# Patient Record
Sex: FEMALE | Race: WHITE | Employment: FULL TIME | URBAN - NONMETROPOLITAN AREA
[De-identification: names, ages, dates, MRNs, and addresses within clinical notes are randomized per-mention and may not be internally consistent; named-entity substitution may affect disease eponyms.]

---

## 2020-07-22 ENCOUNTER — TELEPHONE (OUTPATIENT)
Dept: FAMILY MEDICINE CLINIC | Age: 20
End: 2020-07-22

## 2020-07-22 NOTE — TELEPHONE ENCOUNTER
Pt has been diagnosed with strep and tonsilitis by [facility]. This is the second time she has had strep. She was tested for covid and returned negative 7/16. [facility] recommended that she follow up with PCP. Her last PCP was Dr. Luster Galeazzi. Please call with recommendations/appts.

## 2020-07-29 ENCOUNTER — OFFICE VISIT (OUTPATIENT)
Dept: PRIMARY CARE CLINIC | Age: 20
End: 2020-07-29
Payer: COMMERCIAL

## 2020-07-29 VITALS
RESPIRATION RATE: 18 BRPM | BODY MASS INDEX: 32.76 KG/M2 | HEIGHT: 62 IN | DIASTOLIC BLOOD PRESSURE: 82 MMHG | OXYGEN SATURATION: 96 % | TEMPERATURE: 97.8 F | WEIGHT: 178 LBS | HEART RATE: 81 BPM | SYSTOLIC BLOOD PRESSURE: 128 MMHG

## 2020-07-29 PROCEDURE — 99213 OFFICE O/P EST LOW 20 MIN: CPT | Performed by: FAMILY MEDICINE

## 2020-07-29 RX ORDER — PREDNISONE 10 MG/1
TABLET ORAL
COMMUNITY
Start: 2020-07-25 | End: 2020-11-03

## 2020-07-29 ASSESSMENT — PATIENT HEALTH QUESTIONNAIRE - PHQ9
SUM OF ALL RESPONSES TO PHQ QUESTIONS 1-9: 0
SUM OF ALL RESPONSES TO PHQ QUESTIONS 1-9: 0
SUM OF ALL RESPONSES TO PHQ9 QUESTIONS 1 & 2: 0
1. LITTLE INTEREST OR PLEASURE IN DOING THINGS: 0
2. FEELING DOWN, DEPRESSED OR HOPELESS: 0

## 2020-07-29 ASSESSMENT — ENCOUNTER SYMPTOMS
WHEEZING: 0
NAUSEA: 0
SHORTNESS OF BREATH: 0
VOMITING: 0

## 2020-07-29 NOTE — PROGRESS NOTES
[address] [facility] presents to the office today for   Chief Complaint   Patient presents with   [facility] follow up      Daya Watkins is here for ER follow up  Sore throat and drainage started on 7/19  Went to ER due to level of swelling in tonsils - felt like they were touching  No fever  Feeling a little bit better  Morning still with very sore throat  No contact sports or exercise  Still w/fatigue  She feels able to go back to work at the [place] [address] of [employer]   Constitutional: Negative for chills and fever. Respiratory: Negative for shortness of breath and wheezing. Cardiovascular: Negative for chest pain and palpitations. Gastrointestinal: Negative for nausea and vomiting. Genitourinary: Negative for dysuria, hematuria and urgency. Skin: Negative for rash. Neurological: Negative for dizziness and light-headedness. /82   Pulse 81   Temp 97.8 °F (36.6 °C) (Temporal)   Resp 18   Ht 5' 2\" (1.575 m)   Wt 178 lb (80.7 kg)   SpO2 96%   BMI 32.56 kg/m²   Physical Exam  Constitutional:       Appearance: Normal appearance. HENT:      Head: Normocephalic and atraumatic. Nose: Nose normal.      Mouth/Throat:      Mouth: Mucous membranes are moist.      Pharynx: Pharyngeal swelling, oropharyngeal exudate and posterior oropharyngeal erythema present. Eyes:      Extraocular Movements: Extraocular movements intact. Conjunctiva/sclera: Conjunctivae normal.   Cardiovascular:      Rate and Rhythm: Normal rate. Heart sounds: Normal heart sounds. Pulmonary:      Effort: Pulmonary effort is normal.      Breath sounds: Normal breath sounds. Skin:     General: Skin is warm. Neurological:      Mental Status: She is alert and oriented to person, place, and time.    Psychiatric:         Mood and Affect: Mood normal.         Behavior: Behavior normal.            Current Outpatient Medications:     predniSONE (DELTASONE) 10 MG tablet, TK 4 TS DAILY X 3 DAYS TK 3 TS DAILY X 3 DAYS TK 2 TS DAILY X 3 DAYS TK 1 T DAILY X 3 DAYS, Disp: , Rfl:      Past Medical History:   Diagnosis Date    Depression        Doc Shejasbirr was seen today for establish care.     Diagnoses and all orders for this visit:    Gammaherpesviral mononucleosis without complication       Improving  Ok to return to work as able  No contact sports or activity for 6 weeks  Finish prednisone  Tylenol prn aches and pains  Anticipatory guidance    Mor Christensen MD

## 2020-09-01 ENCOUNTER — HOSPITAL ENCOUNTER (OUTPATIENT)
Age: 20
Discharge: HOME | End: 2020-09-03
Payer: COMMERCIAL

## 2020-09-01 PROCEDURE — 87491 CHLMYD TRACH DNA AMP PROBE: CPT

## 2020-09-01 PROCEDURE — 87070 CULTURE OTHR SPECIMN AEROBIC: CPT

## 2020-09-01 PROCEDURE — 87210 SMEAR WET MOUNT SALINE/INK: CPT

## 2020-09-01 PROCEDURE — 87591 N.GONORRHOEAE DNA AMP PROB: CPT

## 2020-09-04 LAB
C TRACH DNA GENITAL QL NAA+PROBE: NEGATIVE
N. GONORRHOEAE DNA: NEGATIVE
SOURCE: NORMAL

## 2020-09-05 LAB — GENITAL CULTURE, ROUTINE: NORMAL

## 2020-09-07 LAB — CULTURE, TRICHOMONAS: NORMAL

## 2020-11-03 ENCOUNTER — TELEPHONE (OUTPATIENT)
Dept: FAMILY MEDICINE CLINIC | Age: 20
End: 2020-11-03

## 2020-11-03 ENCOUNTER — OFFICE VISIT (OUTPATIENT)
Dept: FAMILY MEDICINE CLINIC | Age: 20
End: 2020-11-03
Payer: COMMERCIAL

## 2020-11-03 VITALS
WEIGHT: 189 LBS | OXYGEN SATURATION: 98 % | BODY MASS INDEX: 34.57 KG/M2 | SYSTOLIC BLOOD PRESSURE: 116 MMHG | DIASTOLIC BLOOD PRESSURE: 76 MMHG | HEART RATE: 94 BPM | TEMPERATURE: 97.1 F

## 2020-11-03 PROCEDURE — 99213 OFFICE O/P EST LOW 20 MIN: CPT | Performed by: FAMILY MEDICINE

## 2020-11-03 RX ORDER — SULFAMETHOXAZOLE AND TRIMETHOPRIM 800; 160 MG/1; MG/1
1 TABLET ORAL 2 TIMES DAILY
Qty: 20 TABLET | Refills: 0 | Status: SHIPPED
Start: 2020-11-03 | End: 2020-12-16

## 2020-11-03 ASSESSMENT — ENCOUNTER SYMPTOMS
EYES NEGATIVE: 1
SINUS PRESSURE: 0
SINUS PAIN: 0
RHINORRHEA: 0
FACIAL SWELLING: 1
SORE THROAT: 0
RESPIRATORY NEGATIVE: 1

## 2020-11-03 NOTE — TELEPHONE ENCOUNTER
Left message for pt to call back    Dr Kevan Pascal would like for pt to be scheduled for appt on Monday 11/9/2020 for a follow up from her express care visit.

## 2020-11-03 NOTE — PROGRESS NOTES
11/3/20  Sarah Taveras : 2000 Sex: female  Age: 21 y.o. Chief Complaint   Patient presents with   Katrinka Frankel     left neck       Patient is a 80-year-old white female with with a chief complaint of a swollen node at the angle of the jaw on the left side of her neck submandibularly. Is been present for about a week she does have a past medical history of mono several months prior. She has had no fevers chills sore throat cough no nasal discharge no areas of cellulitis on the neck or above. Review of Systems   Constitutional: Negative. HENT: Positive for facial swelling. Negative for postnasal drip, rhinorrhea, sinus pressure, sinus pain, sneezing and sore throat. Eyes: Negative. Respiratory: Negative. Cardiovascular: Negative. Skin: Negative. No current outpatient medications on file. Allergies   Allergen Reactions    Amoxicillin        Past Medical History:   Diagnosis Date    Depression     Mononucleosis      No past surgical history on file. Family History   Problem Relation Age of Onset    Diabetes type 2  Maternal Grandfather      Social History     Tobacco Use    Smoking status: Never Smoker    Smokeless tobacco: Never Used   Substance Use Topics    Alcohol use: No    Drug use: No        Vitals:    20 0919   BP: 116/76   Pulse: 94   Temp: 97.1 °F (36.2 °C)   SpO2: 98%   Weight: 189 lb (85.7 kg)       Physical Exam  Vitals signs reviewed. Constitutional:       Appearance: Normal appearance. HENT:      Head: Normocephalic and atraumatic. Right Ear: Tympanic membrane, ear canal and external ear normal. There is no impacted cerumen. Left Ear: Tympanic membrane, ear canal and external ear normal. There is no impacted cerumen. Nose: Nose normal.      Mouth/Throat:      Mouth: Mucous membranes are moist.      Pharynx: Oropharynx is clear. No oropharyngeal exudate or posterior oropharyngeal erythema.    Cardiovascular:      Rate and Rhythm: Normal rate and regular rhythm. Heart sounds: No murmur. Pulmonary:      Effort: Pulmonary effort is normal.      Breath sounds: Normal breath sounds. Abdominal:      General: There is no distension. Palpations: There is no mass. Tenderness: There is no abdominal tenderness. There is no right CVA tenderness, guarding or rebound. Hernia: No hernia is present. Lymphadenopathy:      Cervical: Cervical adenopathy present. Skin:     General: Skin is warm and dry. Findings: Lesion present. Neurological:      Mental Status: She is alert. Assessment and Plan:  There are no diagnoses linked to this encounter. No orders of the defined types were placed in this encounter. Patient advised to follow up with PCP as needed. Seen By:  Gian Kumar, DO  No other adenopathy is palpable I did examine Stensen's duct it is not inflamed there is tenderness somewhat over the parotid into the angle of the jaw and is very jaw and its very tender to palpation. Going to treat her with Bactrim and she is allergic to penicillin and reexamine her in 5 days and to follow with her PCP.

## 2020-11-09 ENCOUNTER — OFFICE VISIT (OUTPATIENT)
Dept: FAMILY MEDICINE CLINIC | Age: 20
End: 2020-11-09
Payer: COMMERCIAL

## 2020-11-09 VITALS
HEART RATE: 90 BPM | OXYGEN SATURATION: 99 % | BODY MASS INDEX: 34.71 KG/M2 | WEIGHT: 189.8 LBS | SYSTOLIC BLOOD PRESSURE: 116 MMHG | DIASTOLIC BLOOD PRESSURE: 78 MMHG | TEMPERATURE: 97.7 F

## 2020-11-09 PROCEDURE — 99213 OFFICE O/P EST LOW 20 MIN: CPT | Performed by: FAMILY MEDICINE

## 2020-11-12 ENCOUNTER — TELEPHONE (OUTPATIENT)
Dept: FAMILY MEDICINE CLINIC | Age: 20
End: 2020-11-12

## 2020-11-12 NOTE — TELEPHONE ENCOUNTER
Called and left a message for the office notifying of urgent referral.  I asked for a return call with an appt.   Faxed referral/imaging/progress note   F# [phone]  P# [phone]

## 2020-11-12 NOTE — TELEPHONE ENCOUNTER
Can someone please call Dr. Eloisa Galeana and see how soon they could see patient? She has left neck mass that needs biopsied?   thanks

## 2020-12-16 ENCOUNTER — OFFICE VISIT (OUTPATIENT)
Dept: PRIMARY CARE CLINIC | Age: 20
End: 2020-12-16
Payer: COMMERCIAL

## 2020-12-16 VITALS
SYSTOLIC BLOOD PRESSURE: 110 MMHG | HEART RATE: 97 BPM | WEIGHT: 194 LBS | TEMPERATURE: 97.2 F | DIASTOLIC BLOOD PRESSURE: 74 MMHG | BODY MASS INDEX: 35.48 KG/M2 | OXYGEN SATURATION: 98 %

## 2020-12-16 PROCEDURE — 99214 OFFICE O/P EST MOD 30 MIN: CPT | Performed by: FAMILY MEDICINE

## 2020-12-16 NOTE — PROGRESS NOTES
[address] presents to the office today for   Chief Complaint   Patient presents with    Other     discuss ct scan of neck ordered by Dr. Evelio Del Toro     CT scan of neck showing possible lymphoma  Dr. Evelio Del Toro appt Friday  Feeling fatigue  Neck feels about the same  No fever or night sweats    Review of Systems     /74   Pulse 97   Temp 97.2 °F (36.2 °C) (Temporal)   Wt 194 lb (88 kg)   SpO2 98%   BMI 35.48 kg/m²   Physical Exam  Constitutional:       Appearance: Normal appearance. HENT:      Head: Normocephalic and atraumatic. Comments: lymphadenopathy     Mouth/Throat:      Pharynx: No posterior oropharyngeal erythema. Eyes:      Extraocular Movements: Extraocular movements intact. Conjunctiva/sclera: Conjunctivae normal.   Cardiovascular:      Rate and Rhythm: Normal rate. Pulmonary:      Effort: Pulmonary effort is normal.   Skin:     General: Skin is warm. Neurological:      Mental Status: She is alert and oriented to person, place, and time. Psychiatric:         Mood and Affect: Mood normal.         Behavior: Behavior normal.          No current outpatient medications on file. Past Medical History:   Diagnosis Date    Depression     Mononucleosis        Nanci Asp was seen today for other. Diagnoses and all orders for this visit:    Neck mass  -     Amb External Referral To Hematology Oncology  -     CBC Auto Differential; Future  -     C-REACTIVE PROTEIN; Future  -     SEDIMENTATION RATE; Future    Lymphoma of lymph nodes of neck, unspecified lymphoma type (HCC)  -     CBC Auto Differential; Future  -     C-REACTIVE PROTEIN; Future  -     SEDIMENTATION RATE;  Future       Referral to oncology  See ENT   Call me with any questions or concerns  Labs pending  I can write work excuse if symptoms preclude working  Pt denies need right now    Rosalie Espinoza MD

## 2020-12-18 ENCOUNTER — TELEPHONE (OUTPATIENT)
Dept: PRIMARY CARE CLINIC | Age: 20
End: 2020-12-18

## 2020-12-18 NOTE — TELEPHONE ENCOUNTER
Pt saw ent this am and would like to discuss what he said with you.   Please call her after 4:00.  [phone]

## 2020-12-23 ENCOUNTER — TELEPHONE (OUTPATIENT)
Dept: PRIMARY CARE CLINIC | Age: 20
End: 2020-12-23

## 2020-12-23 DIAGNOSIS — R22.1 NECK MASS: Primary | ICD-10-CM

## 2020-12-23 DIAGNOSIS — C85.91 LYMPHOMA OF LYMPH NODES OF NECK, UNSPECIFIED LYMPHOMA TYPE (HCC): ICD-10-CM

## 2020-12-23 NOTE — TELEPHONE ENCOUNTER
Patient reports: Dr. Cuauhtemoc Benites not in network, she could see a Dr Mery Lee or Dr. Abraham Ellison. Who would you suggest she see?  Dr. Maryann Steele

## 2020-12-28 ENCOUNTER — TELEPHONE (OUTPATIENT)
Dept: PRIMARY CARE CLINIC | Age: 20
End: 2020-12-28

## 2020-12-28 NOTE — TELEPHONE ENCOUNTER
Pt states that she had her biopsy today on her neck lymphnodes and states she would rather do the follow up with  rather than Dr. Renae Palomo . Is this okay?

## 2020-12-29 NOTE — TELEPHONE ENCOUNTER
S/p surgery   Per primary team       Yes but I do not have pathology from their office yet, can you call and see if it is back and if they can fax? Also, did she get oncology appt?

## 2020-12-29 NOTE — TELEPHONE ENCOUNTER
Spoke to [facility] office, gladis have pathology report for a few day, then they will fax it to us.

## 2021-01-04 NOTE — TELEPHONE ENCOUNTER
[facility] office called back, stated the pathology report is still not back, as soon as they get it, they will fax it to us.

## 2021-01-06 ENCOUNTER — TELEPHONE (OUTPATIENT)
Dept: PRIMARY CARE CLINIC | Age: 21
End: 2021-01-06

## 2021-01-06 DIAGNOSIS — R22.1 NECK MASS: Primary | ICD-10-CM

## 2021-01-06 DIAGNOSIS — C85.91 LYMPHOMA OF LYMPH NODES OF NECK, UNSPECIFIED LYMPHOMA TYPE (HCC): ICD-10-CM

## 2021-01-06 NOTE — TELEPHONE ENCOUNTER
Pt checked her insurance for an oncologist that is in network and she found Dr. Marlon Del Rio ,could Dr. Carlotta Ayala referr her?

## 2021-01-11 NOTE — TELEPHONE ENCOUNTER
Can you tell her they sent it out to [place] for further evaluation, likely to have a more specialized pathologist review it?

## 2021-01-13 ENCOUNTER — TELEPHONE (OUTPATIENT)
Dept: PRIMARY CARE CLINIC | Age: 21
End: 2021-01-13

## 2021-01-13 NOTE — TELEPHONE ENCOUNTER
Call pt  Looks like she was called with pathology results which were negative  See if she can come see me next week to discuss next steps

## 2021-01-20 ENCOUNTER — OFFICE VISIT (OUTPATIENT)
Dept: PRIMARY CARE CLINIC | Age: 21
End: 2021-01-20
Payer: COMMERCIAL

## 2021-01-20 VITALS
TEMPERATURE: 98.1 F | BODY MASS INDEX: 34.04 KG/M2 | OXYGEN SATURATION: 97 % | DIASTOLIC BLOOD PRESSURE: 80 MMHG | SYSTOLIC BLOOD PRESSURE: 110 MMHG | HEART RATE: 101 BPM | HEIGHT: 62 IN | RESPIRATION RATE: 18 BRPM | WEIGHT: 185 LBS

## 2021-01-20 DIAGNOSIS — R22.1 NECK MASS: ICD-10-CM

## 2021-01-20 DIAGNOSIS — J98.59 MEDIASTINAL MASS: Primary | ICD-10-CM

## 2021-01-20 PROCEDURE — 99213 OFFICE O/P EST LOW 20 MIN: CPT | Performed by: FAMILY MEDICINE

## 2021-01-20 ASSESSMENT — PATIENT HEALTH QUESTIONNAIRE - PHQ9
2. FEELING DOWN, DEPRESSED OR HOPELESS: 0
SUM OF ALL RESPONSES TO PHQ QUESTIONS 1-9: 0

## 2021-01-20 NOTE — PROGRESS NOTES
[address] presents to the office today for   Chief Complaint   Patient presents with    Follow-up     To discuss biopsy results. Biopsy neg  Feeling well  No fevers or sweats  Mass in neck has resolved to palpation by patient    Review of Systems     /80   Pulse 101   Temp 98.1 °F (36.7 °C)   Resp 18   Ht 5' 2\" (1.575 m)   Wt 185 lb (83.9 kg)   SpO2 97%   BMI 33.84 kg/m²   Physical Exam  Constitutional:       Appearance: Normal appearance. HENT:      Head: Normocephalic and atraumatic. Eyes:      Extraocular Movements: Extraocular movements intact. Conjunctiva/sclera: Conjunctivae normal.   Cardiovascular:      Rate and Rhythm: Normal rate. Heart sounds: Normal heart sounds. Pulmonary:      Effort: Pulmonary effort is normal.      Breath sounds: Normal breath sounds. Skin:     General: Skin is warm. Neurological:      Mental Status: She is alert and oriented to person, place, and time. Psychiatric:         Mood and Affect: Mood normal.         Behavior: Behavior normal.          No current outpatient medications on file. Past Medical History:   Diagnosis Date    Depression     Mononucleosis        Kin Charley was seen today for follow-up. Diagnoses and all orders for this visit:    Mediastinal mass  -     CT SOFT TISSUE NECK WO CONTRAST; Future    Neck mass  -     CT SOFT TISSUE NECK WO CONTRAST;  Future       I want to check CT again for mediastinal mass  If improving or resolved will defer further work up    Jim Nowak MD

## 2021-04-08 ENCOUNTER — OFFICE VISIT (OUTPATIENT)
Dept: FAMILY MEDICINE CLINIC | Age: 21
End: 2021-04-08
Payer: COMMERCIAL

## 2021-04-08 VITALS
TEMPERATURE: 99 F | BODY MASS INDEX: 36.44 KG/M2 | RESPIRATION RATE: 18 BRPM | OXYGEN SATURATION: 97 % | HEART RATE: 97 BPM | HEIGHT: 62 IN | WEIGHT: 198 LBS | DIASTOLIC BLOOD PRESSURE: 72 MMHG | SYSTOLIC BLOOD PRESSURE: 128 MMHG

## 2021-04-08 DIAGNOSIS — R05.9 COUGH: Primary | ICD-10-CM

## 2021-04-08 LAB
Lab: NORMAL
PERFORMING INSTRUMENT: NORMAL
QC PASS/FAIL: NORMAL
SARS-COV-2, POC: NORMAL

## 2021-04-08 PROCEDURE — 87426 SARSCOV CORONAVIRUS AG IA: CPT | Performed by: PHYSICIAN ASSISTANT

## 2021-04-08 PROCEDURE — 99213 OFFICE O/P EST LOW 20 MIN: CPT | Performed by: PHYSICIAN ASSISTANT

## 2021-04-08 NOTE — PROGRESS NOTES
2021   [address] [place] [id number]     Mirtha Guerrier  : 2000 Age: 21 y.o. Sex: female       Subjective:  Chief Complaint   Patient presents with    Cough       HPI:  The patient states that they have had a cough, runny nose and nasal congestion for the last 2-3 days. Cough is productive of sputum. The patient denies sore throat without pain with swallowing/difficulty swallowing. Denies fever or chills. Denies loss of taste or smell. Denies myalgias fatigue or headache. Patient denies CP or dyspnea. No vomiting or diarrhea. The patient presents for evaluation. ROS:  Positive and pertinent negatives as per HPI. All other systems are reviewed and negative. No current outpatient medications on file. Allergies   Allergen Reactions    Amoxicillin         Objective:  Vitals:    21 1200   BP: 128/72   Pulse: 97   Resp: 18   Temp: 99 °F (37.2 °C)   TempSrc: Temporal   SpO2: 97%   Weight: 198 lb (89.8 kg)   Height: 5' 2\" (1.575 m)      Exam:  Const: Appears healthy and well developed. No signs of acute distress present. Vitals reviewed per triage. Head/Face: Normocephalic, atraumatic. Facies is symmetric. Eyes: PERRL. ENMT: Tympanic membranes are pearly gray with good light reflex bilaterally. Nares with clear rhinorrhea. Buccal mucosa is moist. No erythema in the posterior pharynx without edema of oropharynx or petechiae of palate. Neck: Supple and symmetric. Palpation reveals no adenopathy. No meningeal signs. Trachea midline. Resp: Lungs are clear to auscultation bilaterally without wheezes, rhonchi, or crackles. Chest expansion was symmetrical without accessory muscle use noted. CV: S1 is normal. S2 is normal.  Musculo: Patient moves extremities without pain or limitation. Pulses are equal bilaterally. Skin: Skin is warm and dry. Neuro: Alert and oriented x3. Speech is articulate and fluent.

## 2021-04-12 ENCOUNTER — OFFICE VISIT (OUTPATIENT)
Dept: FAMILY MEDICINE CLINIC | Age: 21
End: 2021-04-12
Payer: COMMERCIAL

## 2021-04-12 VITALS
WEIGHT: 198 LBS | HEIGHT: 62 IN | TEMPERATURE: 100 F | DIASTOLIC BLOOD PRESSURE: 80 MMHG | RESPIRATION RATE: 20 BRPM | SYSTOLIC BLOOD PRESSURE: 130 MMHG | HEART RATE: 106 BPM | OXYGEN SATURATION: 97 % | BODY MASS INDEX: 36.44 KG/M2

## 2021-04-12 DIAGNOSIS — J20.9 ACUTE BRONCHITIS, UNSPECIFIED ORGANISM: Primary | ICD-10-CM

## 2021-04-12 PROCEDURE — 99213 OFFICE O/P EST LOW 20 MIN: CPT | Performed by: PHYSICIAN ASSISTANT

## 2021-04-12 RX ORDER — AZITHROMYCIN 250 MG/1
250 TABLET, FILM COATED ORAL SEE ADMIN INSTRUCTIONS
Qty: 6 TABLET | Refills: 0 | Status: SHIPPED | OUTPATIENT
Start: 2021-04-12 | End: 2021-04-17

## 2021-04-12 RX ORDER — BENZONATATE 200 MG/1
200 CAPSULE ORAL 3 TIMES DAILY PRN
Qty: 21 CAPSULE | Refills: 0 | Status: SHIPPED | OUTPATIENT
Start: 2021-04-12 | End: 2021-04-19

## 2021-04-12 NOTE — PROGRESS NOTES
2021   [address] [place] [phone]     Sarah Coles  : 2000 Age: 21 y.o. Sex: female       Subjective:  Chief Complaint   Patient presents with    Cough       HPI:  The patient states that they have had a cough, runny nose and nasal congestion for the last 7-8 days. Patient was seen last week was negative for Covid. Cough is productive of sputum. The patient denies sore throat without pain with swallowing/difficulty swallowing. Denies fever or chills. Patient states cough is not getting any better. Patient denies CP or dyspnea. No vomiting or diarrhea. The patient presents for evaluation. ROS:  Positive and pertinent negatives as per HPI. All other systems are reviewed and negative. Current Outpatient Medications:     azithromycin (ZITHROMAX) 250 MG tablet, Take 1 tablet by mouth See Admin Instructions for 5 days 500mg on day 1 followed by 250mg on days 2 - 5, Disp: 6 tablet, Rfl: 0    benzonatate (TESSALON) 200 MG capsule, Take 1 capsule by mouth 3 times daily as needed for Cough, Disp: 21 capsule, Rfl: 0   Allergies   Allergen Reactions    Amoxicillin         Objective:  Vitals:    21 1449   BP: 130/80   Pulse: 106   Resp: 20   Temp: 100 °F (37.8 °C)   TempSrc: Tympanic   SpO2: 97%   Weight: 198 lb (89.8 kg)   Height: 5' 2\" (1.575 m)      Exam:  Const: Appears healthy and well developed. No signs of acute distress present. Vitals reviewed per triage. Head/Face: Normocephalic, atraumatic. Facies is symmetric. Eyes: PERRL. ENMT: Tympanic membranes are pearly gray with good light reflex bilaterally. Nares with clear rhinorrhea. Buccal mucosa is moist. No erythema in the posterior pharynx without edema of oropharynx or petechiae of palate. Neck: Supple and symmetric. Palpation reveals no adenopathy. No meningeal signs. Trachea midline.   Resp: Lungs are clear to auscultation bilaterally without wheezes, rhonchi, or crackles. Chest expansion was symmetrical without accessory muscle use noted. CV: S1 is normal. S2 is normal.  Musculo: Patient moves extremities without pain or limitation. Pulses are equal bilaterally. Skin: Skin is warm and dry. Neuro: Alert and oriented x3. Speech is articulate and fluent. Psych: Mood and affect are appropriate to situation. Assessment and Plan:  Pauline Vaughn was seen today for cough. Diagnoses and all orders for this visit:    Acute bronchitis, unspecified organism  -     azithromycin (ZITHROMAX) 250 MG tablet; Take 1 tablet by mouth See Admin Instructions for 5 days 500mg on day 1 followed by 250mg on days 2 - 5    Other orders  -     benzonatate (TESSALON) 200 MG capsule; Take 1 capsule by mouth 3 times daily as needed for Cough         Return for Folow up with PCP in 7-10 days for re-evaluation. .      Discussed the use of probiotics to help prevent GI issues including C-diff colitis.       Seen By:    ANNEMARIE Gaffney

## 2021-07-23 ENCOUNTER — OFFICE VISIT (OUTPATIENT)
Dept: PRIMARY CARE CLINIC | Age: 21
End: 2021-07-23
Payer: COMMERCIAL

## 2021-07-23 VITALS
BODY MASS INDEX: 35.12 KG/M2 | WEIGHT: 192 LBS | OXYGEN SATURATION: 99 % | HEART RATE: 101 BPM | SYSTOLIC BLOOD PRESSURE: 110 MMHG | TEMPERATURE: 97.7 F | DIASTOLIC BLOOD PRESSURE: 60 MMHG

## 2021-07-23 DIAGNOSIS — R05.9 COUGH: Primary | ICD-10-CM

## 2021-07-23 DIAGNOSIS — R53.83 FATIGUE, UNSPECIFIED TYPE: ICD-10-CM

## 2021-07-23 DIAGNOSIS — R51.9 ACUTE NONINTRACTABLE HEADACHE, UNSPECIFIED HEADACHE TYPE: ICD-10-CM

## 2021-07-23 DIAGNOSIS — R52 BODY ACHES: ICD-10-CM

## 2021-07-23 DIAGNOSIS — R06.02 SHORTNESS OF BREATH: ICD-10-CM

## 2021-07-23 LAB
INFLUENZA A ANTIGEN, POC: NORMAL
INFLUENZA B ANTIGEN, POC: NORMAL
Lab: NORMAL
PERFORMING INSTRUMENT: NORMAL
QC PASS/FAIL: NORMAL
SARS-COV-2, POC: NORMAL

## 2021-07-23 PROCEDURE — 87804 INFLUENZA ASSAY W/OPTIC: CPT | Performed by: NURSE PRACTITIONER

## 2021-07-23 PROCEDURE — 99213 OFFICE O/P EST LOW 20 MIN: CPT | Performed by: NURSE PRACTITIONER

## 2021-07-23 PROCEDURE — 87426 SARSCOV CORONAVIRUS AG IA: CPT | Performed by: NURSE PRACTITIONER

## 2021-07-23 RX ORDER — DOXYCYCLINE HYCLATE 100 MG
100 TABLET ORAL 2 TIMES DAILY
Qty: 20 TABLET | Refills: 0 | Status: SHIPPED | OUTPATIENT
Start: 2021-07-23 | End: 2021-08-02

## 2021-07-23 NOTE — PROGRESS NOTES
Chief Complaint   Nausea (diarrhea, prod. cough, sob, ha, st at first, some body aches x 1 week )      History of Present Illness   Source of history provided by:  patient. Fausto Cárdenas is a 24 y.o. old female who presents to the walk in clinic with complaints of Generalized Body Aches, Headache, Pharyngitis, Nasal Congestion, Post nasal drip, productive (yellow mucus) Cough, Chest congestion, Shortness of breath, Nausea, Diarrhea  and Fatigue x 8 days. States symptoms have stayed the same since onset. Has been taking Acetaminophen OTC, without symptomatic relief. Denies any Fever, Vomiting, Chest Pain, LE Edema, Abdominal Pain or Rash. Denies any hx of COPD, emphysema or pneumonia. Reports asthma as a child. Had COVID-19 in [address]. ROS   Pertinent positives and negatives are stated within HPI, all other systems reviewed and are negative. Past Medical History:  has a past medical history of Depression and Mononucleosis. Past Surgical History:  has no past surgical history on file. Social History:  reports that she has never smoked. She has never used smokeless tobacco. She reports that she does not drink alcohol and does not use drugs. Family History: family history includes Diabetes type 2  in her maternal grandfather. Allergies: Amoxicillin    Physical Exam   Vital Signs:  /60   Pulse 101   Temp 97.7 °F (36.5 °C) (Temporal)   Wt 192 lb (87.1 kg)   SpO2 99%   BMI 35.12 kg/m²    Oxygen Saturation Interpretation: Normal.    Constitutional:  Alert, development consistent with age. NAD. Head:  NC/NT. Airway patent. Ears: TMs clear bilaterally. Canals without exudate or swelling bilaterally. Mouth: Posterior pharynx with mild erythema and clear postnasal drip. Mild tonsillar hypertrophy or exudate. Neck:  Normal ROM. Supple. No anterior cervical adenopathy noted. Lungs: CTAB without wheezes, rales, or rhonchi.    CV:  Regular rate and rhythm, normal heart sounds, without pathological murmurs, ectopy, gallops, or rubs. Skin:  Normal turgor. Warm, dry, without visible rash. Lymphatic: No lymphangitis or adenopathy noted. Neurological:  Oriented. Motor functions intact. Lab / Imaging Results   (All laboratory and radiology results have been personally reviewed by myself)  Labs:  Results for orders placed or performed in visit on 07/23/21   POCT COVID-19, Antigen   Result Value Ref Range    SARS-COV-2, POC Not-Detected Not Detected    Lot Number [id number]     QC Pass/Fail pass     Performing Instrument BD Veritor    POCT Influenza A/B Antigen (BD Veritor)   Result Value Ref Range    Inflenza A Ag neg     Influenza B Ag neg        Imaging: All Radiology results interpreted by Radiologist unless otherwise noted. No results found. Medical Decision Making   Pt non-toxic, in no apparent distress and stable at time of discharge. Assessment/Plan   Yaritza Coyle was seen today for nausea. Diagnoses and all orders for this visit:    Cough  -     POCT COVID-19, Antigen  -     POCT Influenza A/B Antigen (BD Veritor)  -     doxycycline hyclate (VIBRA-TABS) 100 MG tablet; Take 1 tablet by mouth 2 times daily for 10 days    Body aches    Acute nonintractable headache, unspecified headache type    Fatigue, unspecified type    Shortness of breath        Rapid COVID-19 swab and influenza A/B obtained and noted to be negative. Increase fluids and rest. Symptomatic relief discussed including Tylenol prn pain/fever. Schedule f/u with PCP in 7-10 days if symptoms persist. ED sooner if symptoms worsen or change. ED immediately with high or refractory fever, progressive SOB, dyspnea, CP, calf pain/swelling, shaking chills, vomiting, abdominal pain, lethargy, flank pain, or decreased urinary output. Pt verbalizes understanding and is in agreement with plan of care. All questions answered.     DONNIE Guaman NP    This visit was provided as a focused evaluation during the [place] -19 pandemic/national emergency. A comprehensive review of all previous patient history and testing was not conducted. Pertinent findings were elicited during the visit. *NOTE: This report was transcribed using voice recognition software. Every effort was made to ensure accuracy; however, inadvertent computerized transcription errors may be present.

## 2021-09-28 ENCOUNTER — OFFICE VISIT (OUTPATIENT)
Dept: PRIMARY CARE CLINIC | Age: 21
End: 2021-09-28
Payer: COMMERCIAL

## 2021-09-28 VITALS
WEIGHT: 190.6 LBS | TEMPERATURE: 97.7 F | HEART RATE: 120 BPM | BODY MASS INDEX: 34.86 KG/M2 | DIASTOLIC BLOOD PRESSURE: 70 MMHG | OXYGEN SATURATION: 97 % | SYSTOLIC BLOOD PRESSURE: 110 MMHG

## 2021-09-28 DIAGNOSIS — R09.81 NASAL CONGESTION: ICD-10-CM

## 2021-09-28 DIAGNOSIS — R50.9 FEVER, UNSPECIFIED FEVER CAUSE: Primary | ICD-10-CM

## 2021-09-28 DIAGNOSIS — R11.2 NAUSEA AND VOMITING, INTRACTABILITY OF VOMITING NOT SPECIFIED, UNSPECIFIED VOMITING TYPE: ICD-10-CM

## 2021-09-28 DIAGNOSIS — J02.9 ACUTE PHARYNGITIS, UNSPECIFIED ETIOLOGY: ICD-10-CM

## 2021-09-28 LAB
Lab: NORMAL
PERFORMING INSTRUMENT: NORMAL
QC PASS/FAIL: NORMAL
SARS-COV-2, POC: NORMAL

## 2021-09-28 PROCEDURE — 87426 SARSCOV CORONAVIRUS AG IA: CPT | Performed by: NURSE PRACTITIONER

## 2021-09-28 PROCEDURE — 99213 OFFICE O/P EST LOW 20 MIN: CPT | Performed by: NURSE PRACTITIONER

## 2021-09-28 RX ORDER — ONDANSETRON 4 MG/1
4 TABLET, ORALLY DISINTEGRATING ORAL 3 TIMES DAILY PRN
Qty: 21 TABLET | Refills: 0 | Status: SHIPPED
Start: 2021-09-28 | End: 2021-12-02

## 2021-09-28 RX ORDER — CEFDINIR 300 MG/1
300 CAPSULE ORAL 2 TIMES DAILY
Qty: 20 CAPSULE | Refills: 0 | Status: SHIPPED | OUTPATIENT
Start: 2021-09-28 | End: 2021-10-08

## 2021-09-28 RX ORDER — FLUTICASONE PROPIONATE 50 MCG
2 SPRAY, SUSPENSION (ML) NASAL DAILY
Qty: 16 G | Refills: 0 | Status: SHIPPED
Start: 2021-09-28 | End: 2021-12-02

## 2021-09-28 NOTE — PROGRESS NOTES
Chief Complaint   Pharyngitis (fever- up to 101, nausea vomiting, sinsus drainage down throat, dizzy x 1 day )      History of Present Illness   Source of history provided by:  patient. Chaz James is a 24 y.o. old female who presents to the walk in clinic with complaints of Fever, Chills, Generalized Body Aches, Headache, Pharyngitis, Post nasal drip, Nausea, Vomiting, Fatigue and Dizziness x 1 days. States symptoms have stayed the same since onset. Has been taking a Cold and Flu medications, without symptomatic relief. Denies any Cough, Shortness of breath, Chest Pain, LE Edema, Abdominal Pain or Rash. Denies any hx of asthma, COPD, emphysema or pneumonia. ROS   Pertinent positives and negatives are stated within HPI, all other systems reviewed and are negative. Past Medical History:  has a past medical history of Depression and Mononucleosis. Past Surgical History:  has no past surgical history on file. Social History:  reports that she has never smoked. She has never used smokeless tobacco. She reports that she does not drink alcohol and does not use drugs. Family History: family history includes Diabetes type 2  in her maternal grandfather. Allergies: Amoxicillin    Physical Exam   Vital Signs:  /70   Pulse 120   Temp 97.7 °F (36.5 °C) (Temporal)   Wt 190 lb 9.6 oz (86.5 kg)   SpO2 97%   BMI 34.86 kg/m²    Oxygen Saturation Interpretation: Normal.    Constitutional:  Alert, development consistent with age. NAD. Head:  NC/NT. Airway patent. Ears: TMs clear bilaterally. Canals without exudate or swelling bilaterally. Mouth: Posterior pharynx with moderate erythema and clear postnasal drip. Slight tonsillar hypertrophy or exudate. Neck:  Normal ROM. Supple. No anterior cervical adenopathy noted. Lungs: CTAB without wheezes, rales, or rhonchi. CV:  Regular rate and rhythm, normal heart sounds, without pathological murmurs, ectopy, gallops, or rubs. Skin:  Normal turgor. Warm, dry, without visible rash. Lymphatic: No lymphangitis or adenopathy noted. Neurological:  Oriented. Motor functions intact. Lab / Imaging Results   (All laboratory and radiology results have been personally reviewed by myself)  Labs:  Results for orders placed or performed in visit on 09/28/21   POCT COVID-19, Antigen   Result Value Ref Range    SARS-COV-2, POC Not-Detected Not Detected    Lot Number [id number]     QC Pass/Fail pass     Performing Instrument BD Veritor        Imaging: All Radiology results interpreted by Radiologist unless otherwise noted. No results found. Medical Decision Making   Pt non-toxic, in no apparent distress and stable at time of discharge. Assessment/Plan   Lainey Ruffin was seen today for pharyngitis. Diagnoses and all orders for this visit:    Fever, unspecified fever cause  -     POCT COVID-19, Antigen  -     COVID-19 Ambulatory; Future    Nausea and vomiting, intractability of vomiting not specified, unspecified vomiting type  -     POCT COVID-19, Antigen  -     COVID-19 Ambulatory; Future  -     ondansetron (ZOFRAN-ODT) 4 MG disintegrating tablet; Take 1 tablet by mouth 3 times daily as needed for Nausea or Vomiting    Nasal congestion  -     fluticasone (FLONASE) 50 MCG/ACT nasal spray; 2 sprays by Each Nostril route daily    Acute pharyngitis, unspecified etiology  -     cefdinir (OMNICEF) 300 MG capsule; Take 1 capsule by mouth 2 times daily for 10 days        Rapid COVID-19 swab noted to be negative. Send out COVID-19 swab obtained and pending, will call with results once available. Advised self-quarantine at home in the interim. Work excuse provided to patient today. Increase fluids and rest. Symptomatic relief discussed including Tylenol prn pain/fever. Schedule f/u with PCP in 7-10 days if symptoms persist. ED sooner if symptoms worsen or change.  ED immediately with high or refractory fever, progressive SOB, dyspnea, CP, calf pain/swelling, shaking chills, vomiting, abdominal pain, lethargy, flank pain, or decreased urinary output. Pt verbalizes understanding and is in agreement with plan of care. All questions answered. DONNIE Allen NP    This visit was provided as a focused evaluation during the [place] -19 pandemic/national emergency. A comprehensive review of all previous patient history and testing was not conducted. Pertinent findings were elicited during the visit. *NOTE: This report was transcribed using voice recognition software. Every effort was made to ensure accuracy; however, inadvertent computerized transcription errors may be present.

## 2021-09-28 NOTE — LETTER
[address] [facility] [address]  Phone: [phone]  Fax: [phone]    DONNIE Flood NP        September 28, 2021     Patient: Sheba Loera   YOB: 2000   Date of Visit: 9/28/2021       To Whom It May Concern: It is my medical opinion that Ty Rizzo should remain out of work until she receives a negative send out COVID-19 swab or, 10 days after the onset of her symptoms. If you have any questions or concerns, please don't hesitate to call.     Sincerely,        DONNIE Flood NP

## 2021-10-05 ENCOUNTER — TELEPHONE (OUTPATIENT)
Dept: PRIMARY CARE CLINIC | Age: 21
End: 2021-10-05

## 2021-10-05 RX ORDER — ALBUTEROL SULFATE 90 UG/1
2 AEROSOL, METERED RESPIRATORY (INHALATION) 4 TIMES DAILY PRN
Qty: 18 G | Refills: 0 | Status: SHIPPED
Start: 2021-10-05 | End: 2021-12-02

## 2021-10-05 NOTE — TELEPHONE ENCOUNTER
Pt was seen in  Walk-in last week and was neg for covid. Taking antibiotic and doing better except still sob. Could she try an inhaler?           YOAV

## 2021-12-02 ENCOUNTER — OFFICE VISIT (OUTPATIENT)
Dept: PRIMARY CARE CLINIC | Age: 21
End: 2021-12-02
Payer: COMMERCIAL

## 2021-12-02 VITALS
HEIGHT: 62 IN | SYSTOLIC BLOOD PRESSURE: 100 MMHG | RESPIRATION RATE: 16 BRPM | HEART RATE: 86 BPM | WEIGHT: 192 LBS | TEMPERATURE: 98 F | OXYGEN SATURATION: 98 % | DIASTOLIC BLOOD PRESSURE: 62 MMHG | BODY MASS INDEX: 35.33 KG/M2

## 2021-12-02 DIAGNOSIS — R20.2 PARESTHESIA OF BOTH HANDS: Primary | ICD-10-CM

## 2021-12-02 PROBLEM — B27.90 INFECTIOUS MONONUCLEOSIS: Status: ACTIVE | Noted: 2021-12-02

## 2021-12-02 PROCEDURE — 99214 OFFICE O/P EST MOD 30 MIN: CPT | Performed by: STUDENT IN AN ORGANIZED HEALTH CARE EDUCATION/TRAINING PROGRAM

## 2021-12-02 RX ORDER — MELOXICAM 7.5 MG/1
7.5 TABLET ORAL DAILY PRN
Qty: 30 TABLET | Refills: 0 | Status: SHIPPED
Start: 2021-12-02 | End: 2022-01-21 | Stop reason: SDUPTHER

## 2021-12-02 NOTE — PATIENT INSTRUCTIONS
Patient Education        Carpal Tunnel Syndrome: Exercises  Introduction  Here are some examples of exercises for you to try. The exercises may be suggested for a condition or for rehabilitation. Start each exercise slowly. Ease off the exercises if you start to have pain. You will be told when to start these exercises and which ones will work best for you. Warm-up stretches  When you no longer have pain or numbness, you can do exercises to help prevent carpal tunnel syndrome from coming back. Do not do any stretch or movement that is uncomfortable or painful. 1. Rotate your wrist up, down, and from side to side. Repeat 4 times. 2. Stretch your fingers far apart. Relax them, and then stretch them again. Repeat 4 times. 3. Stretch your thumb by pulling it back gently, holding it, and then releasing it. Repeat 4 times. How to do the exercises  Prayer stretch    1. Start with your palms together in front of your chest just below your chin. 2. Slowly lower your hands toward your waistline, keeping your hands close to your stomach and your palms together until you feel a mild to moderate stretch under your forearms. 3. Hold for at least 15 to 30 seconds. Repeat 2 to 4 times. Wrist flexor stretch    1. Extend your arm in front of you with your palm up. 2. Bend your wrist, pointing your hand toward the floor. 3. With your other hand, gently bend your wrist farther until you feel a mild to moderate stretch in your forearm. 4. Hold for at least 15 to 30 seconds. Repeat 2 to 4 times. Wrist extensor stretch    1. Repeat steps 1 through 4 of the stretch above, but begin with your extended hand palm down. Follow-up care is a key part of your treatment and safety. Be sure to make and go to all appointments, and call your doctor if you are having problems. It's also a good idea to know your test results and keep a list of the medicines you take. Where can you learn more?   Go to [email or web address] and sign in to your [employer] account. Enter P037 in the [place] box to learn more about \"Carpal Tunnel Syndrome: Exercises. \"     If you do not have an account, please click on the \"Sign Up Now\" link. Current as of: July 1, 2021               Content Version: 13.0  © 8931-9865 Healthwise, [facility]. Care instructions adapted under license by [facility] ([facility]). If you have questions about a medical condition or this instruction, always ask your healthcare professional. [place] [address] any warranty or liability for your use of this information.

## 2021-12-02 NOTE — PROGRESS NOTES
WALK-IN CARE CLINIC VISIT    21  Name: Armand Bates   : 2000   Age: 24 y.o. Sex: female        Assessment & Plan:       ICD-10-CM    1. Paresthesia of both hands  R20.2 XR HAND LEFT (2 VIEWS)     XR HAND RIGHT (2 VIEWS)     DME Order for Orthosis as OP     meloxicam (MOBIC) 7.5 MG tablet       Acute on chronic paresthesias bilateral hands, worse in non-dominant left hand. Suspect carpal tunnel due to pain and paresthesias in median distribution bilaterally. Check XR to rule out bony pathology. Trial exercises, bracing, PRN NSAID with food. Recommend follow-up in 4 weeks. If not improving, recommend labs, EMG. Counseled patient regarding above diagnosis, including possible risks and complications. Counseled patient as appropriate and relevant regarding any possible side effects, risks, and alternatives to treatment; the patient verbalizes understanding, and is in agreement with the plan as detailed above. All educational materials and instructions were discussed and included on the After Visit Summary. All questions answered to the patient's satisfaction. The patient was advised to call for any concerns or return if any of the signs or symptoms worsen. This provider was wearing N95 mask and shield. The patient was wearing surgical mask. We practiced social distancing when appropriate during visit due to COVID-19 pandemic.      Subjective:     Chief Complaint   Patient presents with    Other     c/o numbness and tingling and decreased  in bilateral hands and arms     Other     onset 1 yr ago and getting worse       Patient reports bilateral hand numbness and tingling  Going on for about 1 year now  However worsening for past 1 week  Notes losing , feels hands are weak  Notes difficulty moving digits 1-3 on both hands due to pain  Shoots up the arm  Usually numb/tingling pain, but today was sharp and shooting  Denies history of neck issues  Uses soft braces without relief  Does not take OTC meds  Patient is right handed but left hurts more than right usually    Review of Systems   Musculoskeletal: Positive for arthralgias, joint swelling and myalgias (hands/wrists/lower arms). Neurological: Positive for weakness (hands) and numbness. Medical History:     Patient Active Problem List   Diagnosis    Paresthesia of both hands        Past Medical History:   Diagnosis Date    Depression     Mononucleosis        No past surgical history on file. Family History   Problem Relation Age of Onset    Diabetes type 2  Maternal Grandfather        Medications:   No current outpatient medications on file. Allergies: Allergies   Allergen Reactions    Amoxicillin        Social History:     Social History     Socioeconomic History    Marital status:      Spouse name: Not on file    Number of children: Not on file    Years of education: Not on file    Highest education level: Not on file   Occupational History    Not on file   Tobacco Use    Smoking status: Never Smoker    Smokeless tobacco: Never Used   Substance and Sexual Activity    Alcohol use: No    Drug use: No    Sexual activity: Yes     Partners: Male   Other Topics Concern    Not on file   Social History Narrative    Not on file     Social Determinants of Health     Financial Resource Strain:     Difficulty of Paying Living Expenses: Not on file   Food Insecurity:     Worried About Running Out of Food in the Last Year: Not on file    Leah of Food in the Last Year: Not on file   Transportation Needs:     Lack of Transportation (Medical): Not on file    Lack of Transportation (Non-Medical):  Not on file   Physical Activity:     Days of Exercise per Week: Not on file    Minutes of Exercise per Session: Not on file   Stress:     Feeling of Stress : Not on file   Social Connections:     Frequency of Communication with Friends and Family: Not on file    Frequency of Social Gatherings with Friends and Family: Not on file    Attends Restoration Services: Not on file    Active Member of Clubs or Organizations: Not on file    Attends Club or Organization Meetings: Not on file    Marital Status: Not on file   Intimate Partner Violence:     Fear of Current or Ex-Partner: Not on file    Emotionally Abused: Not on file    Physically Abused: Not on file    Sexually Abused: Not on file   Housing Stability:     Unable to Pay for Housing in the Last Year: Not on file    Number of [place] in the Last Year: Not on file    Unstable Housing in the Last Year: Not on file       Physical Exam:     Vitals:    12/02/21 1559   BP: 100/62   Pulse: 86   Resp: 16   Temp: 98 °F (36.7 °C)   SpO2: 98%   Weight: 192 lb (87.1 kg)   Height: 5' 2\" (1.575 m)        Physical Exam  Vitals and nursing note reviewed. Constitutional:       General: She is not in acute distress. Appearance: Normal appearance. She is obese. She is not ill-appearing or diaphoretic. Cardiovascular:      Rate and Rhythm: Normal rate. Pulmonary:      Effort: No respiratory distress. Musculoskeletal:      Right forearm: No deformity or tenderness. Left forearm: No deformity or tenderness. Right wrist: Tenderness (palmar wrist) present. No deformity, effusion, bony tenderness or crepitus. Normal range of motion. Normal pulse. Left wrist: Tenderness (palmar wrist) present. No deformity, effusion, bony tenderness or crepitus. Normal range of motion. Normal pulse. Right hand: No swelling, deformity, tenderness or bony tenderness. Normal range of motion. Normal strength. Decreased sensation of the median distribution. Normal capillary refill. Normal pulse. Left hand: No swelling, deformity, tenderness or bony tenderness. Normal range of motion. Normal strength. Decreased sensation of the median distribution. Normal capillary refill. Normal pulse. Comments: Positive phalen and tinel tests.  Negative Bernarda [place] test.   Skin:     General: Skin is warm and dry. Capillary Refill: Capillary refill takes less than 2 seconds. Neurological:      Mental Status: She is alert and oriented to person, place, and time. Sensory: Sensory deficit present. Motor: No weakness. Coordination: Coordination normal.      Deep Tendon Reflexes: Reflexes normal.         Testing:     Orders Placed This Encounter   Procedures    XR HAND LEFT (2 VIEWS)     Standing Status:   Future     Standing Expiration Date:   12/2/2022     Order Specific Question:   Reason for exam:     Answer:   chronic hand pain, rule out bony pathology    XR HAND RIGHT (2 VIEWS)     Standing Status:   Future     Standing Expiration Date:   12/2/2022     Order Specific Question:   Reason for exam:     Answer:   chronic hand pain, rule out bony pathology    DME Order for Orthosis as OP     Bilateral hard wrist braces for carpal tunnel        No results found for this or any previous visit (from the past 25 hour(s)).

## 2022-01-10 LAB
PROLACTIN: NORMAL
SEX HORMONE BINDING GLOBULIN: NORMAL
TESTOSTERONE FREE: NORMAL
TESTOSTERONE TOTAL: NORMAL
TESTOSTERONE, BIOAVAILABLE: NORMAL

## 2022-01-11 ENCOUNTER — OFFICE VISIT (OUTPATIENT)
Dept: PRIMARY CARE CLINIC | Age: 22
End: 2022-01-11
Payer: COMMERCIAL

## 2022-01-11 VITALS
TEMPERATURE: 97.1 F | WEIGHT: 193 LBS | HEART RATE: 75 BPM | OXYGEN SATURATION: 97 % | DIASTOLIC BLOOD PRESSURE: 76 MMHG | SYSTOLIC BLOOD PRESSURE: 110 MMHG | BODY MASS INDEX: 35.3 KG/M2

## 2022-01-11 DIAGNOSIS — R07.9 CHEST PAIN, UNSPECIFIED TYPE: ICD-10-CM

## 2022-01-11 DIAGNOSIS — R07.89 COSTOCHONDRAL CHEST PAIN: Primary | ICD-10-CM

## 2022-01-11 PROCEDURE — 93000 ELECTROCARDIOGRAM COMPLETE: CPT | Performed by: NURSE PRACTITIONER

## 2022-01-11 PROCEDURE — 99213 OFFICE O/P EST LOW 20 MIN: CPT | Performed by: NURSE PRACTITIONER

## 2022-01-11 RX ORDER — PREDNISONE 20 MG/1
20 TABLET ORAL 2 TIMES DAILY
Qty: 10 TABLET | Refills: 0 | Status: SHIPPED | OUTPATIENT
Start: 2022-01-11 | End: 2022-01-16

## 2022-01-11 NOTE — PROGRESS NOTES
Chief Complaint:       Chest Pain (since the first of the year, was exposed to covid, but was never tested and only had runny nose and scratchy throat, none now ) and Shortness of Breath      History of Present Illness   Source of history provided by:  patient. Britni Casillas is a 24 y.o. old female who presents to express care with complaints of chest pain x 11 days. States the pain is located over the upper chest, bilaterally. States the pain does not radiate to the left/right shoulder, jaw, through the back, or to the opposite side of the chest.  Pain is worse with moving. Pt has not had pain like this before. Pt has taken Tylenol to alleviate pain, but states it did not help. The CP does cause SOB. Also, reports occasional dizziness. Denies any N/V, diaphoresis, HA, fever, cough, or recent illness. No syncope, palpitations, or edema. Denies any hx of COPD, or tobacco use. States she has a history of asthma. ROS    Unless otherwise stated in this report or unable to obtain because of the patient's clinical or mental status as evidenced by the medical record, this patients's positive and negative responses for Review of Systems, constitutional, psych, eyes, ENT, cardiovascular, respiratory, gastrointestinal, neurological, genitourinary, musculoskeletal, integument systems and systems related to the presenting problem are either stated in the preceding or were not pertinent or were negative for the symptoms and/or complaints related to the medical problem. Past Medical History:  has a past medical history of Depression and Mononucleosis. Past Surgical History:  has no past surgical history on file. Social History:  reports that she has never smoked. She has never used smokeless tobacco. She reports that she does not drink alcohol and does not use drugs. Family History: family history includes Diabetes type 2  in her maternal grandfather.    Allergies: Amoxicillin    Physical Exam calf pain/swelling, etc. Pt is in agreement with this care plan. All questions answered. Return if symptoms worsen or fail to improve.     DONNIE Cedillo - NP

## 2022-01-21 ENCOUNTER — OFFICE VISIT (OUTPATIENT)
Dept: PRIMARY CARE CLINIC | Age: 22
End: 2022-01-21
Payer: COMMERCIAL

## 2022-01-21 VITALS
WEIGHT: 197 LBS | RESPIRATION RATE: 16 BRPM | BODY MASS INDEX: 36.25 KG/M2 | TEMPERATURE: 98.1 F | HEIGHT: 62 IN | SYSTOLIC BLOOD PRESSURE: 110 MMHG | DIASTOLIC BLOOD PRESSURE: 78 MMHG | HEART RATE: 94 BPM | OXYGEN SATURATION: 97 %

## 2022-01-21 DIAGNOSIS — Z20.822 SUSPECTED COVID-19 VIRUS INFECTION: ICD-10-CM

## 2022-01-21 DIAGNOSIS — E66.9 OBESITY (BMI 30-39.9): ICD-10-CM

## 2022-01-21 DIAGNOSIS — Z76.89 ENCOUNTER TO ESTABLISH CARE WITH NEW DOCTOR: Primary | ICD-10-CM

## 2022-01-21 DIAGNOSIS — R20.8 ALLODYNIA: ICD-10-CM

## 2022-01-21 DIAGNOSIS — R20.2 PARESTHESIA OF BOTH HANDS: ICD-10-CM

## 2022-01-21 DIAGNOSIS — E78.2 MIXED HYPERLIPIDEMIA: ICD-10-CM

## 2022-01-21 DIAGNOSIS — M79.10 MYALGIA: ICD-10-CM

## 2022-01-21 PROCEDURE — 99214 OFFICE O/P EST MOD 30 MIN: CPT | Performed by: STUDENT IN AN ORGANIZED HEALTH CARE EDUCATION/TRAINING PROGRAM

## 2022-01-21 RX ORDER — PREDNISONE 10 MG/1
TABLET ORAL
Qty: 30 TABLET | Refills: 0 | Status: SHIPPED | OUTPATIENT
Start: 2022-01-21 | End: 2022-02-02

## 2022-01-21 RX ORDER — MELOXICAM 7.5 MG/1
7.5 TABLET ORAL DAILY PRN
Qty: 30 TABLET | Refills: 2 | Status: SHIPPED
Start: 2022-01-21 | End: 2022-01-25

## 2022-01-21 ASSESSMENT — PATIENT HEALTH QUESTIONNAIRE - PHQ9
2. FEELING DOWN, DEPRESSED OR HOPELESS: 0
DEPRESSION UNABLE TO ASSESS: FUNCTIONAL CAPACITY MOTIVATION LIMITS ACCURACY
SUM OF ALL RESPONSES TO PHQ QUESTIONS 1-9: 0
SUM OF ALL RESPONSES TO PHQ QUESTIONS 1-9: 0
1. LITTLE INTEREST OR PLEASURE IN DOING THINGS: 0
SUM OF ALL RESPONSES TO PHQ QUESTIONS 1-9: 0
SUM OF ALL RESPONSES TO PHQ9 QUESTIONS 1 & 2: 0
SUM OF ALL RESPONSES TO PHQ QUESTIONS 1-9: 0

## 2022-01-21 ASSESSMENT — ENCOUNTER SYMPTOMS
CHEST TIGHTNESS: 0
NAUSEA: 0
SHORTNESS OF BREATH: 0
DIARRHEA: 0
VOMITING: 0
COUGH: 0
BACK PAIN: 1
WHEEZING: 0

## 2022-01-21 NOTE — PROGRESS NOTES
NEW PRIMARY CARE VISIT    22  Name: Toño Watson   : 2000   Age: 24 y.o. Sex: female        Assessment & Plan:     Problem List Items Addressed This Visit        Other    Obesity (BMI 30-39. 9)     Screening labs by OBGYN notable for hyperlipidemia  Healthy lifestyle recommended         Mixed hyperlipidemia     Recommend healthy lifestyle only due to age  [de-identified] fasting annual visit         Paresthesia of both hands     Suspect carpal tunnel  Improved with exercises, bracing, meloxicam  Continue meloxicam as needed with food           Other Visit Diagnoses     Encounter to establish care with new doctor    -  Primary    History reviewed and updated    Suspected COVID-19 virus infection        Resolved    Allodynia        Likely secondary to presumed COVID-19  Improved with prednisone, repeat taper given persistent pain  Continue meloxicam as needed with food    Myalgia              Counseled patient regarding above diagnosis, including possible risks and complications, especially if left uncontrolled. Counseled patient as appropriate and relevant regarding any possible side effects, risks, and alternatives to treatment; the patient verbalizes understanding, and is in agreement with the plan as detailed above. All educational materials and instructions were discussed and included on the After Visit Summary. All questions answered to the patient's satisfaction. The patient was advised to call or send [employer] message for any concerns prior to next appointment. Return in about 1 year (around 2023) for annual.    This provider and patient were wearing surgical masks and practiced social distancing when appropriate during visit due to COVID-19 pandemic. Subjective:     Chief Complaint   Patient presents with    Other     follow up from chest pain       Patient needs new PCP. Last PCP changing locations. Following up for chest pain.  Notes improved with prednisone, but when completed medication, it came back. Notes it first started after she thinks she had COVID; quarantined only, did not get tested because her sister was positive. Pain comes and goes, in different spots each time. Hurts when she presses on the spots. Sometimes feels like even her skin hurts. Does not matter if she is resting or doing activity, can happen at any time. Does not know if it hurts more when she moves her arms. Notes the meloxicam helped for her hands. Did not know she could try this for her chest.       Review of Systems   Constitutional: Negative for chills, diaphoresis and fever. Respiratory: Negative for cough, chest tightness, shortness of breath and wheezing. Cardiovascular: Positive for chest pain (in her skin and muscles) and palpitations (chronic). Gastrointestinal: Negative for diarrhea, nausea and vomiting. Musculoskeletal: Positive for back pain (chronic) and myalgias (chest; hands are better). Skin: Negative for rash. Neurological: Positive for numbness (hands, improved). Negative for weakness. Medical History:   History reviewed and updated as needed. Patient Active Problem List   Diagnosis    Paresthesia of both hands    PCOS (polycystic ovarian syndrome)        Past Medical History:   Diagnosis Date    Depression     Mononucleosis 07/2021    Polycystic ovaries        History reviewed. No pertinent surgical history. Family History   Problem Relation Age of Onset    Diabetes type 2  Maternal Grandfather     No Known Problems Mother     No Known Problems Father     Polycystic Ovary Syndrome Sister     No Known Problems Maternal Grandmother     No Known Problems Paternal Grandmother     No Known Problems Paternal Grandfather     No Known Problems Sister        Medications:     Current Outpatient Medications:     meloxicam (MOBIC) 7.5 MG tablet, Take 1 tablet by mouth daily as needed for Pain, Disp: 30 tablet, Rfl: 0    Allergies:      Allergies   Allergen Reactions    Amoxicillin        Social History:     Social History     Socioeconomic History    Marital status:      Spouse name: Not on file    Number of children: Not on file    Years of education: Not on file    Highest education level: Not on file   Occupational History    Not on file   Tobacco Use    Smoking status: Never Smoker    Smokeless tobacco: Never Used   Vaping Use    Vaping Use: Never used   Substance and Sexual Activity    Alcohol use: No    Drug use: No    Sexual activity: Yes     Partners: Male     Comment: 1 male, condoms   Other Topics Concern    Not on file   Social History Narrative    Not on file     Social Determinants of Health     Financial Resource Strain:     Difficulty of Paying Living Expenses: Not on file   Food Insecurity:     Worried About Running Out of Food in the Last Year: Not on file    Leah of Food in the Last Year: Not on file   Transportation Needs:     Lack of Transportation (Medical): Not on file    Lack of Transportation (Non-Medical):  Not on file   Physical Activity:     Days of Exercise per Week: Not on file    Minutes of Exercise per Session: Not on file   Stress:     Feeling of Stress : Not on file   Social Connections:     Frequency of Communication with Friends and Family: Not on file    Frequency of Social Gatherings with Friends and Family: Not on file    Attends Sabianist Services: Not on file    Active Member of [address] [place] [address] [employer] or Organizations: Not on file    Attends Club or Organization Meetings: Not on file    Marital Status: Not on file   Intimate Partner Violence:     Fear of Current or Ex-Partner: Not on file    Emotionally Abused: Not on file    Physically Abused: Not on file    Sexually Abused: Not on file   Housing Stability:     Unable to Pay for Housing in the Last Year: Not on file    Number of [place] in the Last Year: Not on file    Unstable Housing in the Last Year: Not on file       Physical Exam: Vitals:    01/21/22 1648   BP: 110/78   Pulse: 94   Resp: 16   Temp: 98.1 °F (36.7 °C)   TempSrc: Temporal   SpO2: 97%   Weight: 197 lb (89.4 kg)   Height: 5' 2\" (1.575 m)       BP Readings from Last 3 Encounters:   01/21/22 110/78   01/11/22 110/76   01/04/22 122/83       Wt Readings from Last 3 Encounters:   01/21/22 197 lb (89.4 kg)   01/11/22 193 lb (87.5 kg)   01/04/22 192 lb (87.1 kg)        Physical Exam  Vitals and nursing note reviewed. Constitutional:       General: She is not in acute distress. Appearance: Normal appearance. She is obese. She is not ill-appearing or diaphoretic. Cardiovascular:      Rate and Rhythm: Normal rate and regular rhythm. Heart sounds: Normal heart sounds. Pulmonary:      Effort: Pulmonary effort is normal. No respiratory distress. Breath sounds: Normal breath sounds. Chest:      Chest wall: Tenderness (mild, diffuse, to palpation) present. No mass, deformity, swelling, crepitus or edema. Musculoskeletal:      Right forearm: No deformity or tenderness. Left forearm: No deformity or tenderness. Right wrist: Tenderness (palmar wrist) present. No deformity, effusion, bony tenderness or crepitus. Normal range of motion. Normal pulse. Left wrist: Tenderness (palmar wrist) present. No deformity, effusion, bony tenderness or crepitus. Normal range of motion. Normal pulse. Right hand: No swelling, deformity, tenderness or bony tenderness. Normal range of motion. Normal strength. Decreased sensation of the median distribution. Normal capillary refill. Normal pulse. Left hand: No swelling, deformity, tenderness or bony tenderness. Normal range of motion. Normal strength. Decreased sensation of the median distribution. Normal capillary refill. Normal pulse. Comments: Positive phalen and tinel tests. Negative Sancho test.   Skin:     General: Skin is warm and dry. Capillary Refill: Capillary refill takes less than 2 seconds. Neurological:      Mental Status: She is alert and oriented to person, place, and time. Sensory: Sensory deficit present. Motor: No weakness. Coordination: Coordination normal.      Deep Tendon Reflexes: Reflexes normal.         Testing:   No orders of the defined types were placed in this encounter. No results found for this or any previous visit (from the past 24 hour(s)).

## 2022-01-26 DIAGNOSIS — N92.1 MENOMETRORRHAGIA: ICD-10-CM

## 2022-02-18 ENCOUNTER — OFFICE VISIT (OUTPATIENT)
Dept: PRIMARY CARE CLINIC | Age: 22
End: 2022-02-18
Payer: COMMERCIAL

## 2022-02-18 VITALS
BODY MASS INDEX: 36.21 KG/M2 | TEMPERATURE: 97.3 F | WEIGHT: 198 LBS | SYSTOLIC BLOOD PRESSURE: 110 MMHG | HEART RATE: 62 BPM | OXYGEN SATURATION: 98 % | DIASTOLIC BLOOD PRESSURE: 66 MMHG

## 2022-02-18 DIAGNOSIS — F41.9 ANXIETY: ICD-10-CM

## 2022-02-18 DIAGNOSIS — F33.1 MODERATE EPISODE OF RECURRENT MAJOR DEPRESSIVE DISORDER (HCC): Primary | ICD-10-CM

## 2022-02-18 PROCEDURE — 99213 OFFICE O/P EST LOW 20 MIN: CPT | Performed by: NURSE PRACTITIONER

## 2022-02-18 RX ORDER — MELOXICAM 7.5 MG/1
7.5 TABLET ORAL DAILY
COMMUNITY
End: 2022-08-25

## 2022-02-18 ASSESSMENT — PATIENT HEALTH QUESTIONNAIRE - PHQ9
8. MOVING OR SPEAKING SO SLOWLY THAT OTHER PEOPLE COULD HAVE NOTICED. OR THE OPPOSITE, BEING SO FIGETY OR RESTLESS THAT YOU HAVE BEEN MOVING AROUND A LOT MORE THAN USUAL: 0
SUM OF ALL RESPONSES TO PHQ9 QUESTIONS 1 & 2: 6
SUM OF ALL RESPONSES TO PHQ QUESTIONS 1-9: 17
9. THOUGHTS THAT YOU WOULD BE BETTER OFF DEAD, OR OF HURTING YOURSELF: 1
SUM OF ALL RESPONSES TO PHQ QUESTIONS 1-9: 17
SUM OF ALL RESPONSES TO PHQ QUESTIONS 1-9: 16
5. POOR APPETITE OR OVEREATING: 2
3. TROUBLE FALLING OR STAYING ASLEEP: 3
4. FEELING TIRED OR HAVING LITTLE ENERGY: 3
10. IF YOU CHECKED OFF ANY PROBLEMS, HOW DIFFICULT HAVE THESE PROBLEMS MADE IT FOR YOU TO DO YOUR WORK, TAKE CARE OF THINGS AT HOME, OR GET ALONG WITH OTHER PEOPLE: 2
2. FEELING DOWN, DEPRESSED OR HOPELESS: 3
SUM OF ALL RESPONSES TO PHQ QUESTIONS 1-9: 17
6. FEELING BAD ABOUT YOURSELF - OR THAT YOU ARE A FAILURE OR HAVE LET YOURSELF OR YOUR FAMILY DOWN: 2
1. LITTLE INTEREST OR PLEASURE IN DOING THINGS: 3
7. TROUBLE CONCENTRATING ON THINGS, SUCH AS READING THE NEWSPAPER OR WATCHING TELEVISION: 0

## 2022-02-18 ASSESSMENT — ENCOUNTER SYMPTOMS
COUGH: 0
SORE THROAT: 0
CONSTIPATION: 0
SINUS PAIN: 0
EYE DISCHARGE: 0
ABDOMINAL PAIN: 0
RHINORRHEA: 0
EYE REDNESS: 0
DIARRHEA: 0
VOMITING: 0
BACK PAIN: 0
ABDOMINAL DISTENTION: 0
COLOR CHANGE: 0
CHEST TIGHTNESS: 0
TROUBLE SWALLOWING: 0
CHOKING: 0
EYE PAIN: 0
PHOTOPHOBIA: 0
SINUS PRESSURE: 0
EYE ITCHING: 0
BLOOD IN STOOL: 0
WHEEZING: 0
VOICE CHANGE: 0
NAUSEA: 0
SHORTNESS OF BREATH: 1
FACIAL SWELLING: 0

## 2022-02-18 ASSESSMENT — COLUMBIA-SUICIDE SEVERITY RATING SCALE - C-SSRS
6. HAVE YOU EVER DONE ANYTHING, STARTED TO DO ANYTHING, OR PREPARED TO DO ANYTHING TO END YOUR LIFE?: NO
1. WITHIN THE PAST MONTH, HAVE YOU WISHED YOU WERE DEAD OR WISHED YOU COULD GO TO SLEEP AND NOT WAKE UP?: YES
2. HAVE YOU ACTUALLY HAD ANY THOUGHTS OF KILLING YOURSELF?: NO

## 2022-02-18 ASSESSMENT — VISUAL ACUITY: OU: 1

## 2022-02-18 NOTE — PROGRESS NOTES
syncope, facial asymmetry, speech difficulty, weakness, light-headedness, numbness and headaches. Hematological: Does not bruise/bleed easily. Psychiatric/Behavioral: Positive for dysphoric mood (Since June of 2021). Negative for agitation, behavioral problems, confusion, decreased concentration, hallucinations, self-injury (in high school), sleep disturbance and suicidal ideas. The patient is nervous/anxious (Having panic attacks). Current Outpatient Medications:     meloxicam (MOBIC) 7.5 MG tablet, Take 7.5 mg by mouth daily, Disp: , Rfl:     sertraline (ZOLOFT) 50 MG tablet, Take 1 tablet by mouth daily, Disp: 30 tablet, Rfl: 5  Allergies   Allergen Reactions    Amoxicillin        Past Medical History:   Diagnosis Date    Depression     Mononucleosis 07/2021    Polycystic ovaries      History reviewed. No pertinent surgical history.   Family History   Problem Relation Age of Onset    Diabetes type 2  Maternal Grandfather     No Known Problems Mother     No Known Problems Father     Polycystic Ovary Syndrome Sister     No Known Problems Maternal Grandmother     No Known Problems Paternal Grandmother     No Known Problems Paternal Grandfather     No Known Problems Sister      Social History     Socioeconomic History    Marital status:      Spouse name: Not on file    Number of children: Not on file    Years of education: Not on file    Highest education level: Not on file   Occupational History    Not on file   Tobacco Use    Smoking status: Never Smoker    Smokeless tobacco: Never Used   Vaping Use    Vaping Use: Never used   Substance and Sexual Activity    Alcohol use: No    Drug use: No    Sexual activity: Yes     Partners: Male     Comment: 1 male, condoms   Other Topics Concern    Not on file   Social History Narrative    Not on file     Social Determinants of Health     Financial Resource Strain:     Difficulty of Paying Living Expenses: Not on file   Food Insecurity:     Worried About Running Out of Food in the Last Year: Not on file    Leah of Food in the Last Year: Not on file   Transportation Needs:     Lack of Transportation (Medical): Not on file    Lack of Transportation (Non-Medical): Not on file   Physical Activity:     Days of Exercise per Week: Not on file    Minutes of Exercise per Session: Not on file   Stress:     Feeling of Stress : Not on file   Social Connections:     Frequency of Communication with Friends and Family: Not on file    Frequency of Social Gatherings with Friends and Family: Not on file    Attends Church Services: Not on file    Active Member of [address] [place] [address] [employer] or Organizations: Not on file    Attends Club or Organization Meetings: Not on file    Marital Status: Not on file   Intimate Partner Violence:     Fear of Current or Ex-Partner: Not on file    Emotionally Abused: Not on file    Physically Abused: Not on file    Sexually Abused: Not on file   Housing Stability:     Unable to Pay for Housing in the Last Year: Not on file    Number of [place] in the Last Year: Not on file    Unstable Housing in the Last Year: Not on file       Vitals:    02/18/22 1509   BP: 110/66   Pulse: 62   Temp: 97.3 °F (36.3 °C)   TempSrc: Temporal   SpO2: 98%   Weight: 198 lb (89.8 kg)       Physical Exam  Vitals and nursing note reviewed. Constitutional:       General: She is awake. She is not in acute distress. Appearance: Normal appearance. She is well-developed. She is obese. She is not ill-appearing, toxic-appearing or diaphoretic. HENT:      Head: Normocephalic and atraumatic. Right Ear: Hearing and external ear normal. There is no impacted cerumen. Left Ear: Hearing and external ear normal. There is no impacted cerumen. Nose: Nose normal. No congestion or rhinorrhea. Mouth/Throat:      Lips: Pink. No lesions. Mouth: Mucous membranes are moist.      Pharynx: Oropharynx is clear.  No oropharyngeal exudate or posterior oropharyngeal erythema. Eyes:      General: Lids are normal. Vision grossly intact. Gaze aligned appropriately. No scleral icterus. Right eye: No discharge. Left eye: No discharge. Extraocular Movements: Extraocular movements intact. Conjunctiva/sclera: Conjunctivae normal.      Right eye: Right conjunctiva is not injected. Left eye: Left conjunctiva is not injected. Pupils: Pupils are equal, round, and reactive to light. Neck:      Thyroid: No thyromegaly. Vascular: No carotid bruit. Trachea: Trachea normal.   Cardiovascular:      Rate and Rhythm: Normal rate and regular rhythm. Pulses: Normal pulses. Heart sounds: Normal heart sounds, S1 normal and S2 normal. No murmur heard. No friction rub. No gallop. Pulmonary:      Effort: Pulmonary effort is normal. No tachypnea, accessory muscle usage or respiratory distress. Breath sounds: Normal breath sounds and air entry. No stridor. No wheezing, rhonchi or rales. Chest:      Chest wall: No tenderness. Abdominal:      General: Bowel sounds are normal. There is no distension. Palpations: Abdomen is soft. There is no mass. Tenderness: There is no abdominal tenderness. There is no right CVA tenderness, left CVA tenderness, guarding or rebound. Hernia: No hernia is present. Musculoskeletal:         General: No swelling, tenderness, deformity or signs of injury. Normal range of motion. Cervical back: Full passive range of motion without pain, normal range of motion and neck supple. No rigidity. No muscular tenderness. Right lower leg: No edema. Left lower leg: No edema. Lymphadenopathy:      Cervical: No cervical adenopathy. Skin:     General: Skin is warm and dry. Capillary Refill: Capillary refill takes less than 2 seconds. Coloration: Skin is not jaundiced or pale. Findings: No bruising, erythema, lesion or rash.    Neurological: General: No focal deficit present. Mental Status: She is alert and oriented to person, place, and time. Mental status is at baseline. Cranial Nerves: Cranial nerves are intact. No cranial nerve deficit. Sensory: Sensation is intact. No sensory deficit. Motor: Motor function is intact. No weakness. Coordination: Coordination is intact. Coordination normal.      Gait: Gait is intact. Gait normal.      Deep Tendon Reflexes: Reflexes normal.   Psychiatric:         Attention and Perception: Attention and perception normal.         Mood and Affect: Mood is depressed. Affect is flat. Speech: Speech normal.         Behavior: Behavior normal. Behavior is cooperative. Thought Content: Thought content normal.         Cognition and Memory: Cognition and memory normal.         Judgment: Judgment normal.         Assessment and Plan:  George Martinez was seen today for depression. Diagnoses and all orders for this visit:    Moderate episode of recurrent major depressive disorder (HCC)  -     sertraline (ZOLOFT) 50 MG tablet; Take 1 tablet by mouth daily    Anxiety  -     sertraline (ZOLOFT) 50 MG tablet; Take 1 tablet by mouth daily        Discussions/Education provided to patients during visit:  [] Discussed the importance to stop smoking. [] Advised to monitor eating habits. [] Reviewed and discussed Imaging results. [] Reviewed and discussed Lab results. [x] Discussed the importance of drinking plenty of fluids. [] Cut down on Salt, Caffeine, and Sugar. [x] Continue Medications as Discussed. [x] Communicated with patient any concerns, to phone office. Return in about 4 weeks (around 3/18/2022) for Review of chronic conditions. I spent 15 minutes face-to-face with this patient.       Seen By:  DONNIE Wayne NP

## 2022-05-30 NOTE — ASSESSMENT & PLAN NOTE
Suspect carpal tunnel  Improved with exercises, bracing, meloxicam  Continue meloxicam as needed with food

## 2022-06-08 ENCOUNTER — TELEPHONE (OUTPATIENT)
Dept: PRIMARY CARE CLINIC | Age: 22
End: 2022-06-08

## 2022-06-08 NOTE — TELEPHONE ENCOUNTER
PT CALLED AND STATES SHE IS HAVING BAD MOOD SWINGS FROM SERTRALINE 25 MG AND WANTS TO COME IN TO DISCUSS IN NEAR FUTURE. YOUR SCHEDULE IS FULL FOR THE NEXT WEEK. WOULD LIKE A CALL TODAY OR  TOMORROW TO LET HER KNOW WHEN SHE CAN BE SEEN.   PHONE  # [phone]

## 2022-06-08 NOTE — TELEPHONE ENCOUNTER
Patient can be double booked on June 14 at 1145 or June 16 at 4:00.   Please call patient to ask her which works best.

## 2022-06-15 ENCOUNTER — OFFICE VISIT (OUTPATIENT)
Dept: FAMILY MEDICINE CLINIC | Age: 22
End: 2022-06-15
Payer: COMMERCIAL

## 2022-06-15 VITALS
WEIGHT: 198 LBS | TEMPERATURE: 99.1 F | RESPIRATION RATE: 18 BRPM | OXYGEN SATURATION: 97 % | SYSTOLIC BLOOD PRESSURE: 116 MMHG | HEART RATE: 119 BPM | HEIGHT: 62 IN | BODY MASS INDEX: 36.44 KG/M2 | DIASTOLIC BLOOD PRESSURE: 64 MMHG

## 2022-06-15 DIAGNOSIS — J06.9 UPPER RESPIRATORY TRACT INFECTION, UNSPECIFIED TYPE: ICD-10-CM

## 2022-06-15 DIAGNOSIS — R05.9 COUGH: Primary | ICD-10-CM

## 2022-06-15 PROCEDURE — 99213 OFFICE O/P EST LOW 20 MIN: CPT | Performed by: PHYSICIAN ASSISTANT

## 2022-06-15 RX ORDER — BROMPHENIRAMINE MALEATE, PSEUDOEPHEDRINE HYDROCHLORIDE, AND DEXTROMETHORPHAN HYDROBROMIDE 2; 30; 10 MG/5ML; MG/5ML; MG/5ML
5 SYRUP ORAL 4 TIMES DAILY PRN
Qty: 120 ML | Refills: 0 | Status: SHIPPED
Start: 2022-06-15 | End: 2022-07-14

## 2022-06-15 RX ORDER — AZITHROMYCIN 250 MG/1
250 TABLET, FILM COATED ORAL SEE ADMIN INSTRUCTIONS
Qty: 6 TABLET | Refills: 0 | Status: SHIPPED | OUTPATIENT
Start: 2022-06-15 | End: 2022-06-20

## 2022-06-15 ASSESSMENT — ENCOUNTER SYMPTOMS
SHORTNESS OF BREATH: 0
VOMITING: 0
BACK PAIN: 0
NAUSEA: 0
COUGH: 1
DIARRHEA: 0
ABDOMINAL PAIN: 0
PHOTOPHOBIA: 0
SORE THROAT: 0

## 2022-06-15 NOTE — PROGRESS NOTES
6/15/22  Wayne Almeida : 2000 Sex: female  Age 25 y.o. Subjective:  Chief Complaint   Patient presents with    Cough     x 4 days    Otalgia         80-year-old female presents to the walk in clinic for evaluation of cough and ear discomfort since Saturday. She does have sputum production with cough. She is taking over-the-counter cold and flu medication. She denies fever, chills, nausea or vomiting. No shortness of breath or chest pain. She denies any known sick contacts. She denies chance of pregnancy. She has not vaccinated for COVID-19 but states that she took a home test on Saturday and it was negative. Review of Systems   Constitutional: Negative for chills and fever. HENT: Positive for congestion and ear pain. Negative for sore throat. Eyes: Negative for photophobia and visual disturbance. Respiratory: Positive for cough. Negative for shortness of breath. Cardiovascular: Negative for chest pain. Gastrointestinal: Negative for abdominal pain, diarrhea, nausea and vomiting. Genitourinary: Negative for difficulty urinating, dysuria, frequency and urgency. Musculoskeletal: Negative for back pain, neck pain and neck stiffness. Skin: Negative for rash. Neurological: Negative for dizziness, syncope, weakness, light-headedness and headaches. Hematological: Negative for adenopathy. Does not bruise/bleed easily. Psychiatric/Behavioral: Negative for agitation and confusion. All other systems reviewed and are negative. PMH:     Past Medical History:   Diagnosis Date    Depression     Mononucleosis 2021    Polycystic ovaries        History reviewed. No pertinent surgical history.     Family History   Problem Relation Age of Onset    Diabetes type 2  Maternal Grandfather     No Known Problems Mother     No Known Problems Father     Polycystic Ovary Syndrome Sister     No Known Problems Maternal Grandmother     No Known Problems Paternal Grandmother  No Known Problems Paternal Grandfather     No Known Problems Sister        Medications:     Current Outpatient Medications:     azithromycin (ZITHROMAX) 250 MG tablet, Take 1 tablet by mouth See Admin Instructions for 5 days 500mg on day 1 followed by 250mg on days 2 - 5, Disp: 6 tablet, Rfl: 0    brompheniramine-pseudoephedrine-DM 2-30-10 MG/5ML syrup, Take 5 mLs by mouth 4 times daily as needed for Congestion or Cough, Disp: 120 mL, Rfl: 0    meloxicam (MOBIC) 7.5 MG tablet, Take 7.5 mg by mouth daily, Disp: , Rfl:     sertraline (ZOLOFT) 50 MG tablet, Take 1 tablet by mouth daily, Disp: 30 tablet, Rfl: 5    Allergies: Allergies   Allergen Reactions    Amoxicillin        Social History:     Social History     Tobacco Use    Smoking status: Never Smoker    Smokeless tobacco: Never Used   Vaping Use    Vaping Use: Never used   Substance Use Topics    Alcohol use: No    Drug use: No       Patient lives at home. Physical Exam:     Vitals:    06/15/22 1243   BP: 116/64   Pulse: (!) 119   Resp: 18   Temp: 99.1 °F (37.3 °C)   TempSrc: Temporal   SpO2: 97%   Weight: 198 lb (89.8 kg)   Height: 5' 2\" (1.575 m)       Exam:  Physical Exam  Vitals and nursing note reviewed. Constitutional:       General: She is not in acute distress. Appearance: She is well-developed. HENT:      Head: Normocephalic and atraumatic. Right Ear: Tympanic membrane normal.      Left Ear: Tympanic membrane normal.      Nose: Nose normal.      Mouth/Throat:      Mouth: Mucous membranes are moist.      Pharynx: Oropharynx is clear. Eyes:      Conjunctiva/sclera: Conjunctivae normal.      Pupils: Pupils are equal, round, and reactive to light. Cardiovascular:      Rate and Rhythm: Normal rate and regular rhythm. Pulmonary:      Effort: Pulmonary effort is normal. No respiratory distress. Breath sounds: Normal breath sounds. Abdominal:      General: Bowel sounds are normal.      Palpations: Abdomen is soft. Tenderness: There is no abdominal tenderness. Musculoskeletal:         General: Normal range of motion. Cervical back: Normal range of motion. No rigidity. Lymphadenopathy:      Cervical: No cervical adenopathy. Skin:     General: Skin is warm and dry. Neurological:      General: No focal deficit present. Mental Status: She is alert and oriented to person, place, and time. Psychiatric:         Mood and Affect: Mood normal.         Behavior: Behavior normal.         Thought Content: Thought content normal.         Judgment: Judgment normal.           Testing:           Medical Decision Making:     Patient upon arrival did not appear toxic or lethargic. Vital signs were reviewed. Past medical history reviewed. Allergies reviewed. Medications reviewed. Patient is presenting with the above complaint of cough and congestion. Differential diagnosis was discussed with the patient. Patient will be given a prescription for Z-Dale and Bromfed-DM. Patient may use over-the-counter analgesics as needed. Patient is continue to monitor symptoms and follow-up with their primary care provider in 3-5 days if no improvement. Patient will return or go to the emergency department for any worsening symptoms. Patient understands the plan is agreeable. Clinical Impression:   Yaritza Coyle was seen today for cough and otalgia. Diagnoses and all orders for this visit:    Cough  -     azithromycin (ZITHROMAX) 250 MG tablet; Take 1 tablet by mouth See Admin Instructions for 5 days 500mg on day 1 followed by 250mg on days 2 - 5    Upper respiratory tract infection, unspecified type    Other orders  -     brompheniramine-pseudoephedrine-DM 2-30-10 MG/5ML syrup; Take 5 mLs by mouth 4 times daily as needed for Congestion or Cough        The patient is to call for any concerns or return if any of the signs or symptoms worsen.  The patient is to follow-up with PCP in the next 2-3 days for repeat evaluation repeat assessment or go directly to the emergency department. SIGNATURE: Isa Burdick PA-C

## 2022-06-16 ENCOUNTER — OFFICE VISIT (OUTPATIENT)
Dept: PRIMARY CARE CLINIC | Age: 22
End: 2022-06-16
Payer: COMMERCIAL

## 2022-06-16 VITALS
TEMPERATURE: 97.6 F | HEART RATE: 94 BPM | OXYGEN SATURATION: 97 % | HEIGHT: 62 IN | DIASTOLIC BLOOD PRESSURE: 70 MMHG | RESPIRATION RATE: 18 BRPM | BODY MASS INDEX: 35.85 KG/M2 | WEIGHT: 194.8 LBS | SYSTOLIC BLOOD PRESSURE: 96 MMHG

## 2022-06-16 DIAGNOSIS — J06.9 VIRAL URI WITH COUGH: ICD-10-CM

## 2022-06-16 DIAGNOSIS — E78.2 MIXED HYPERLIPIDEMIA: ICD-10-CM

## 2022-06-16 DIAGNOSIS — E66.9 OBESITY (BMI 30-39.9): ICD-10-CM

## 2022-06-16 DIAGNOSIS — F33.1 MODERATE EPISODE OF RECURRENT MAJOR DEPRESSIVE DISORDER (HCC): Primary | ICD-10-CM

## 2022-06-16 DIAGNOSIS — D72.829 LEUKOCYTOSIS, UNSPECIFIED TYPE: ICD-10-CM

## 2022-06-16 PROCEDURE — 99214 OFFICE O/P EST MOD 30 MIN: CPT | Performed by: STUDENT IN AN ORGANIZED HEALTH CARE EDUCATION/TRAINING PROGRAM

## 2022-06-16 RX ORDER — SERTRALINE HYDROCHLORIDE 100 MG/1
100 TABLET, FILM COATED ORAL DAILY
Qty: 30 TABLET | Refills: 1 | Status: SHIPPED
Start: 2022-06-16 | End: 2022-08-25

## 2022-06-16 SDOH — ECONOMIC STABILITY: FOOD INSECURITY: WITHIN THE PAST 12 MONTHS, YOU WORRIED THAT YOUR FOOD WOULD RUN OUT BEFORE YOU GOT MONEY TO BUY MORE.: NEVER TRUE

## 2022-06-16 SDOH — ECONOMIC STABILITY: FOOD INSECURITY: WITHIN THE PAST 12 MONTHS, THE FOOD YOU BOUGHT JUST DIDN'T LAST AND YOU DIDN'T HAVE MONEY TO GET MORE.: NEVER TRUE

## 2022-06-16 ASSESSMENT — PATIENT HEALTH QUESTIONNAIRE - PHQ9
SUM OF ALL RESPONSES TO PHQ QUESTIONS 1-9: 17
5. POOR APPETITE OR OVEREATING: 3
3. TROUBLE FALLING OR STAYING ASLEEP: 3
SUM OF ALL RESPONSES TO PHQ QUESTIONS 1-9: 17
SUM OF ALL RESPONSES TO PHQ QUESTIONS 1-9: 17
4. FEELING TIRED OR HAVING LITTLE ENERGY: 3
8. MOVING OR SPEAKING SO SLOWLY THAT OTHER PEOPLE COULD HAVE NOTICED. OR THE OPPOSITE, BEING SO FIGETY OR RESTLESS THAT YOU HAVE BEEN MOVING AROUND A LOT MORE THAN USUAL: 0
6. FEELING BAD ABOUT YOURSELF - OR THAT YOU ARE A FAILURE OR HAVE LET YOURSELF OR YOUR FAMILY DOWN: 3
SUM OF ALL RESPONSES TO PHQ9 QUESTIONS 1 & 2: 4
SUM OF ALL RESPONSES TO PHQ QUESTIONS 1-9: 17
1. LITTLE INTEREST OR PLEASURE IN DOING THINGS: 3
2. FEELING DOWN, DEPRESSED OR HOPELESS: 1
9. THOUGHTS THAT YOU WOULD BE BETTER OFF DEAD, OR OF HURTING YOURSELF: 0
7. TROUBLE CONCENTRATING ON THINGS, SUCH AS READING THE NEWSPAPER OR WATCHING TELEVISION: 1

## 2022-06-16 ASSESSMENT — SOCIAL DETERMINANTS OF HEALTH (SDOH): HOW HARD IS IT FOR YOU TO PAY FOR THE VERY BASICS LIKE FOOD, HOUSING, MEDICAL CARE, AND HEATING?: SOMEWHAT HARD

## 2022-06-16 NOTE — PROGRESS NOTES
past, worked well for her then  Hated counseling, has not done that for several years  Was severe back in February, came through walk-in clinic  Was started back on sertraline 50mg daily  Seemed to help at first but having lows every few weeks, feels like dose needs to be increased  Has not had any anxiety since restarting sertraline  Had passive SI last month, didn't want to be here anymore, lasted a few days   is supportive, she was able to confide in him      Review of Systems   Constitutional: Negative for chills and fever. HENT: Positive for congestion and ear pain. Negative for sore throat. Respiratory: Positive for cough. Negative for shortness of breath. Cardiovascular: Negative for chest pain. Psychiatric/Behavioral: Positive for dysphoric mood, sleep disturbance and suicidal ideas (passive). The patient is not nervous/anxious (resolved). Medical History:     Patient Active Problem List   Diagnosis    Paresthesia of both hands    PCOS (polycystic ovarian syndrome)    Obesity (BMI 30-39. 9)    Mixed hyperlipidemia        Past Medical History:   Diagnosis Date    Depression     Mononucleosis 07/2021    Polycystic ovaries        History reviewed. No pertinent surgical history.     Family History   Problem Relation Age of Onset    Diabetes type 2  Maternal Grandfather     No Known Problems Mother     No Known Problems Father     Polycystic Ovary Syndrome Sister     No Known Problems Maternal Grandmother     No Known Problems Paternal Grandmother     No Known Problems Paternal Grandfather     No Known Problems Sister        Medications:     Current Outpatient Medications:     azithromycin (ZITHROMAX) 250 MG tablet, Take 1 tablet by mouth See Admin Instructions for 5 days 500mg on day 1 followed by 250mg on days 2 - 5, Disp: 6 tablet, Rfl: 0    brompheniramine-pseudoephedrine-DM 2-30-10 MG/5ML syrup, Take 5 mLs by mouth 4 times daily as needed for Congestion or Cough, Disp: 120 mL, Rfl: 0    meloxicam (MOBIC) 7.5 MG tablet, Take 7.5 mg by mouth daily, Disp: , Rfl:     sertraline (ZOLOFT) 50 MG tablet, Take 1 tablet by mouth daily, Disp: 30 tablet, Rfl: 5    Allergies: Allergies   Allergen Reactions    Amoxicillin        Social History:     Social History     Socioeconomic History    Marital status:      Spouse name: Not on file    Number of children: Not on file    Years of education: Not on file    Highest education level: Not on file   Occupational History    Not on file   Tobacco Use    Smoking status: Never Smoker    Smokeless tobacco: Never Used   Vaping Use    Vaping Use: Never used   Substance and Sexual Activity    Alcohol use: No    Drug use: No    Sexual activity: Yes     Partners: Male     Comment: 1 male, condoms   Other Topics Concern    Not on file   Social History Narrative    Not on file     Social Determinants of Health     Financial Resource Strain: Medium Risk    Difficulty of Paying Living Expenses: Somewhat hard   Food Insecurity: No Food Insecurity    Worried About Running Out of Food in the Last Year: Never true    Leah of Food in the Last Year: Never true   Transportation Needs:     Lack of Transportation (Medical): Not on file    Lack of Transportation (Non-Medical):  Not on file   Physical Activity:     Days of Exercise per Week: Not on file    Minutes of Exercise per Session: Not on file   Stress:     Feeling of Stress : Not on file   Social Connections:     Frequency of Communication with Friends and Family: Not on file    Frequency of Social Gatherings with Friends and Family: Not on file    Attends Taoist Services: Not on file    Active Member of Clubs or Organizations: Not on file    Attends Club or Organization Meetings: Not on file    Marital Status: Not on file   Intimate Partner Violence:     Fear of Current or Ex-Partner: Not on file    Emotionally Abused: Not on file    Physically Abused: Not on file  Sexually Abused: Not on file   Housing Stability:     Unable to Pay for Housing in the Last Year: Not on file    Number of Places Lived in the Last Year: Not on file    Unstable Housing in the Last Year: Not on file       Physical Exam:     Vitals:    06/16/22 1556 06/16/22 1600 06/16/22 1623   BP: 96/70     Pulse: (!) 105 (!) 111 94   Resp: 18     Temp: 97.6 °F (36.4 °C)     SpO2: 97%     Weight: 194 lb 12.8 oz (88.4 kg)     Height: 5' 2\" (1.575 m)         BP Readings from Last 3 Encounters:   06/16/22 96/70   06/15/22 116/64   02/18/22 110/66       Wt Readings from Last 3 Encounters:   06/16/22 194 lb 12.8 oz (88.4 kg)   06/15/22 198 lb (89.8 kg)   02/18/22 198 lb (89.8 kg)       Physical Exam  Vitals and nursing note reviewed. Constitutional:       General: She is not in acute distress. Appearance: Normal appearance. She is obese. She is not ill-appearing or diaphoretic. Cardiovascular:      Rate and Rhythm: Normal rate and regular rhythm. Heart sounds: Normal heart sounds. Pulmonary:      Effort: Pulmonary effort is normal. No respiratory distress. Breath sounds: Normal breath sounds. Musculoskeletal:      Right lower leg: No edema. Left lower leg: No edema. Skin:     General: Skin is warm and dry. Neurological:      Mental Status: She is alert and oriented to person, place, and time. Psychiatric:         Attention and Perception: Attention and perception normal.         Mood and Affect: Affect normal. Mood is depressed. Speech: Speech normal.         Behavior: Behavior normal. Behavior is cooperative. Thought Content: Thought content normal. Thought content does not include homicidal or suicidal ideation. Thought content does not include homicidal or suicidal plan.          PHQ-9 Total Score: 17 (6/16/2022  4:17 PM)  Thoughts that you would be better off dead, or of hurting yourself in some way: 0 (6/16/2022  4:17 PM)      Testing:     Orders Placed This Encounter   Procedures    Lipid, Fasting     Standing Status:   Future     Standing Expiration Date:   6/16/2023    CBC with Auto Differential     Standing Status:   Future     Standing Expiration Date:   6/16/2023        No results found for this or any previous visit (from the past 24 hour(s)).

## 2022-07-14 ENCOUNTER — OFFICE VISIT (OUTPATIENT)
Dept: PRIMARY CARE CLINIC | Age: 22
End: 2022-07-14
Payer: COMMERCIAL

## 2022-07-14 VITALS
BODY MASS INDEX: 35.88 KG/M2 | WEIGHT: 195 LBS | HEIGHT: 62 IN | SYSTOLIC BLOOD PRESSURE: 112 MMHG | OXYGEN SATURATION: 97 % | DIASTOLIC BLOOD PRESSURE: 82 MMHG | TEMPERATURE: 97.7 F | HEART RATE: 87 BPM

## 2022-07-14 DIAGNOSIS — F41.0 GENERALIZED ANXIETY DISORDER WITH PANIC ATTACKS: ICD-10-CM

## 2022-07-14 DIAGNOSIS — F41.1 GENERALIZED ANXIETY DISORDER WITH PANIC ATTACKS: ICD-10-CM

## 2022-07-14 DIAGNOSIS — F33.1 MODERATE EPISODE OF RECURRENT MAJOR DEPRESSIVE DISORDER (HCC): Primary | ICD-10-CM

## 2022-07-14 PROCEDURE — 99214 OFFICE O/P EST MOD 30 MIN: CPT | Performed by: STUDENT IN AN ORGANIZED HEALTH CARE EDUCATION/TRAINING PROGRAM

## 2022-07-14 RX ORDER — HYDROXYZINE HYDROCHLORIDE 25 MG/1
25 TABLET, FILM COATED ORAL EVERY 8 HOURS PRN
Qty: 90 TABLET | Refills: 2 | Status: SHIPPED | OUTPATIENT
Start: 2022-07-14

## 2022-07-14 RX ORDER — SERTRALINE HYDROCHLORIDE 100 MG/1
100 TABLET, FILM COATED ORAL DAILY
Qty: 30 TABLET | Refills: 1 | Status: CANCELLED | OUTPATIENT
Start: 2022-07-14

## 2022-07-14 RX ORDER — FLUOXETINE HYDROCHLORIDE 20 MG/1
20 CAPSULE ORAL DAILY
Qty: 30 CAPSULE | Refills: 2 | Status: SHIPPED
Start: 2022-07-14 | End: 2022-08-25 | Stop reason: SDUPTHER

## 2022-07-14 ASSESSMENT — ENCOUNTER SYMPTOMS
SORE THROAT: 0
COUGH: 1
SHORTNESS OF BREATH: 0

## 2022-07-14 NOTE — PATIENT INSTRUCTIONS
Take 1/2 tablet Zoloft (20mg) nightly for 2 weeks then stop. Then start Prozac 20mg. Patient Education        Learning About Sleeping Well  What does sleeping well mean? Sleeping well means getting enough sleep to feel good and stay healthy. Howmuch sleep is enough varies among people. The number of hours you sleep and how you feel when you wake up are both important. If you do not feel refreshed, you probably need more sleep. Anothersign of not getting enough sleep is feeling tired during the day. Experts recommend that adults get at least 7 or more hours of sleep per day. Children and older adults need more sleep. Why is getting enough sleep important? Getting enough quality sleep is a basic part of good health. When your sleep suffers, your physical health, mood, and your thoughts can suffer too. You may find yourself feeling more grumpy or stressed. Not getting enough sleep also can lead to serious problems, including injury, accidents, anxiety, anddepression. What might cause poor sleeping? Many things can cause sleep problems, including:   Changes to your sleep schedule.  Stress. Stress can be caused by fear about a single event, such as giving a speech. Or you may have ongoing stress, such as worry about work or school.  Depression, anxiety, and other mental or emotional conditions.  Changes in your sleep habits or surroundings. This includes changes that happen where you sleep, such as noise, light, or sleeping in a different bed. It also includes changes in your sleep pattern, such as having jet lag or working a late shift.  Health problems, such as pain, breathing problems, and restless legs syndrome.  Lack of regular exercise.  Using alcohol, nicotine, or caffeine before bed. How can you help yourself? Here are some tips that may help you sleep more soundly and wake up feelingmore refreshed. Your sleeping area    Use your bedroom only for sleeping and sex.  A bit of light reading may help you fall asleep. But if it doesn't, do your reading elsewhere in the house. Try not to use your TV, computer, smartphone, or tablet while you are in bed.  Be sure your bed is big enough to stretch out comfortably, especially if you have a sleep partner.  Keep your bedroom quiet, dark, and cool. Use curtains, blinds, or a sleep mask to block out light. To block out noise, use earplugs, soothing music, or a \"white noise\" machine. Your evening and bedtime routine    Create a relaxing bedtime routine. You might want to take a warm shower or bath, or listen to soothing music.  Go to bed at the same time every night. And get up at the same time every morning, even if you feel tired. What to avoid    Limit caffeine (coffee, tea, caffeinated sodas) during the day, and don't have any for at least 6 hours before bedtime.  Avoid drinking alcohol before bedtime. Alcohol can cause you to wake up more often during the night.  Try not to smoke or use tobacco, especially in the evening. Nicotine can keep you awake.  Limit naps during the day, especially close to bedtime.  Avoid lying in bed awake for too long. If you can't fall asleep or if you wake up in the middle of the night and can't get back to sleep within about 20 minutes, get out of bed and go to another room until you feel sleepy.  Avoid taking medicine right before bed that may keep you awake or make you feel hyper or energized. Your doctor can tell you if your medicine may do this and if you can take it earlier in the day. If you can't sleep    Imagine yourself in a peaceful, pleasant scene. Focus on the details and feelings of being in a place that is relaxing.  Get up and do a quiet or boring activity until you feel sleepy.  Avoid drinking any liquids before going to bed to help prevent waking up often to use the bathroom. Where can you learn more? Go to https://familia.[employer]. org and sign in to your MyChart account. Enter U325 in the [facility] box to learn more about \"Learning About Sleeping Well. \"     If you do not have an account, please click on the \"Sign Up Now\" link. Current as of: February 9, 2022               Content Version: 13.3  © 8307-1652 [employer], [employer]orporated. Care instructions adapted under license by [facility] ([facility]). If you have questions about a medical condition or this instruction, always ask your healthcare professional. [address] any warranty or liability for your use of this information.

## 2022-07-14 NOTE — PROGRESS NOTES
ESTABLISHED PRIMARY CARE VISIT    22  Name: Yanet Sheehan   : 2000   Age: 25 y.o. Sex: female        Assessment & Plan:     Problem List Items Addressed This Visit        Other    Moderate episode of recurrent major depressive disorder ([facility] [address] - Primary     Remains uncontrolled  Passive SI resolved  However side effects with increased dose of Zoloft and no improvement in mood  Wean Zoloft and start Prozac 20 mg daily  Follow-up closely         Relevant Medications    FLUoxetine (PROZAC) 20 MG capsule    hydrOXYzine HCl (ATARAX) 25 MG tablet    Generalized anxiety disorder with panic attacks     See depression  Start hydroxyzine as needed         Relevant Medications    FLUoxetine (PROZAC) 20 MG capsule    hydrOXYzine HCl (ATARAX) 25 MG tablet          Counseled patient regarding above diagnosis, including possible risks and complications, especially if left uncontrolled. Counseled patient as appropriate and relevant regarding any possible side effects, risks, and alternatives to treatment; the patient verbalizes understanding, and is in agreement with the plan as detailed above. All educational materials and instructions were discussed and included on the After Visit Summary. All questions answered to the patient's satisfaction. The patient was advised to call or send [employer] message for any concerns prior to next appointment. Return in about 6 weeks (around 2022) for follow-up mood. This provider and patient were wearing surgical masks and practiced social distancing when appropriate during visit due to COVID-19 pandemic.     Subjective:     Chief Complaint   Patient presents with    Medication Check       Patient returns for follow-up medication  Has had a migraine constantly for past 2 weeks  Wonders if it might be the Zoloft increased dose  Depression has not changed much  Anxiety mostly controlled except with loud noises and crowds  Still not sleeping well      Review of Systems   Neurological: Positive for dizziness and headaches. Psychiatric/Behavioral: Positive for dysphoric mood and sleep disturbance. Negative for suicidal ideas. The patient is nervous/anxious. Medical History:     Patient Active Problem List   Diagnosis    Paresthesia of both hands    PCOS (polycystic ovarian syndrome)    Obesity (BMI 30-39. 9)    Mixed hyperlipidemia    Moderate episode of recurrent major depressive disorder [facility])        Past Medical History:   Diagnosis Date    Depression     Mononucleosis 07/2021    Polycystic ovaries        History reviewed. No pertinent surgical history. Family History   Problem Relation Age of Onset    Diabetes type 2  Maternal Grandfather     No Known Problems Mother     No Known Problems Father     Polycystic Ovary Syndrome Sister     No Known Problems Maternal Grandmother     No Known Problems Paternal Grandmother     No Known Problems Paternal Grandfather     No Known Problems Sister        Medications:     Current Outpatient Medications:     sertraline (ZOLOFT) 100 MG tablet, Take 1 tablet by mouth daily, Disp: 30 tablet, Rfl: 1    meloxicam (MOBIC) 7.5 MG tablet, Take 7.5 mg by mouth daily Prn, Disp: , Rfl:     brompheniramine-pseudoephedrine-DM 2-30-10 MG/5ML syrup, Take 5 mLs by mouth 4 times daily as needed for Congestion or Cough, Disp: 120 mL, Rfl: 0    Allergies:      Allergies   Allergen Reactions    Amoxicillin        Social History:     Social History     Socioeconomic History    Marital status:      Spouse name: Not on file    Number of children: Not on file    Years of education: Not on file    Highest education level: Not on file   Occupational History    Not on file   Tobacco Use    Smoking status: Never Smoker    Smokeless tobacco: Never Used   Vaping Use    Vaping Use: Never used   Substance and Sexual Activity    Alcohol use: No    Drug use: No    Sexual activity: Yes     Partners: Male     Comment: 1 male, condoms   Other Topics Concern    Not on file   Social History Narrative    Not on file     Social Determinants of Health     Financial Resource Strain: Medium Risk    Difficulty of Paying Living Expenses: Somewhat hard   Food Insecurity: No Food Insecurity    Worried About Running Out of Food in the Last Year: Never true    Leah of Food in the Last Year: Never true   Transportation Needs:     Lack of Transportation (Medical): Not on file    Lack of Transportation (Non-Medical): Not on file   Physical Activity:     Days of Exercise per Week: Not on file    Minutes of Exercise per Session: Not on file   Stress:     Feeling of Stress : Not on file   Social Connections:     Frequency of Communication with Friends and Family: Not on file    Frequency of Social Gatherings with Friends and Family: Not on file    Attends Restorationist Services: Not on file    Active Member of [address] [place] [address] [employer] or Organizations: Not on file    Attends Club or Organization Meetings: Not on file    Marital Status: Not on file   Intimate Partner Violence:     Fear of Current or Ex-Partner: Not on file    Emotionally Abused: Not on file    Physically Abused: Not on file    Sexually Abused: Not on file   Housing Stability:     Unable to Pay for Housing in the Last Year: Not on file    Number of [place] in the Last Year: Not on file    Unstable Housing in the Last Year: Not on file       Physical Exam:     Vitals:    07/14/22 1524   BP: 112/82   Site: Left Upper Arm   Position: Sitting   Pulse: 87   Temp: 97.7 °F (36.5 °C)   SpO2: 97%   Weight: 195 lb (88.5 kg)   Height: 5' 2\" (1.575 m)       BP Readings from Last 3 Encounters:   07/14/22 112/82   06/16/22 96/70   06/15/22 116/64       Wt Readings from Last 3 Encounters:   07/14/22 195 lb (88.5 kg)   06/16/22 194 lb 12.8 oz (88.4 kg)   06/15/22 198 lb (89.8 kg)       Physical Exam  Vitals and nursing note reviewed.    Constitutional:       General: She is not in acute distress. Appearance: Normal appearance. She is obese. She is not ill-appearing or diaphoretic. Eyes:      Extraocular Movements: Extraocular movements intact. Conjunctiva/sclera: Conjunctivae normal.   Cardiovascular:      Rate and Rhythm: Normal rate. Pulmonary:      Effort: No respiratory distress. Neurological:      Mental Status: She is alert and oriented to person, place, and time. Psychiatric:         Attention and Perception: Attention and perception normal.         Mood and Affect: Affect normal. Mood is depressed. Speech: Speech normal.         Behavior: Behavior normal. Behavior is cooperative. Thought Content: Thought content normal.         Testing:   No orders of the defined types were placed in this encounter. No results found for this or any previous visit (from the past 24 hour(s)).

## 2022-07-14 NOTE — ASSESSMENT & PLAN NOTE
Remains uncontrolled  Passive SI resolved  However side effects with increased dose of Zoloft and no improvement in mood  Wean Zoloft and start Prozac 20 mg daily  Follow-up closely

## 2022-08-25 ENCOUNTER — OFFICE VISIT (OUTPATIENT)
Dept: PRIMARY CARE CLINIC | Age: 22
End: 2022-08-25
Payer: COMMERCIAL

## 2022-08-25 VITALS
TEMPERATURE: 97.5 F | HEIGHT: 62 IN | HEART RATE: 97 BPM | SYSTOLIC BLOOD PRESSURE: 128 MMHG | DIASTOLIC BLOOD PRESSURE: 80 MMHG | OXYGEN SATURATION: 97 % | WEIGHT: 197 LBS | BODY MASS INDEX: 36.25 KG/M2

## 2022-08-25 DIAGNOSIS — F41.1 GENERALIZED ANXIETY DISORDER WITH PANIC ATTACKS: ICD-10-CM

## 2022-08-25 DIAGNOSIS — F33.1 MODERATE EPISODE OF RECURRENT MAJOR DEPRESSIVE DISORDER (HCC): ICD-10-CM

## 2022-08-25 DIAGNOSIS — F41.0 GENERALIZED ANXIETY DISORDER WITH PANIC ATTACKS: ICD-10-CM

## 2022-08-25 PROCEDURE — 99214 OFFICE O/P EST MOD 30 MIN: CPT | Performed by: STUDENT IN AN ORGANIZED HEALTH CARE EDUCATION/TRAINING PROGRAM

## 2022-08-25 RX ORDER — FLUOXETINE HYDROCHLORIDE 20 MG/1
20 CAPSULE ORAL DAILY
Qty: 30 CAPSULE | Refills: 2 | Status: SHIPPED
Start: 2022-08-25 | End: 2022-10-19

## 2022-08-25 ASSESSMENT — ENCOUNTER SYMPTOMS
DIARRHEA: 0
NAUSEA: 0
VOMITING: 0

## 2022-08-25 NOTE — PROGRESS NOTES
ESTABLISHED PRIMARY CARE VISIT    22  Name: Tavares Todd   : 2000   Age: 25 y.o. Sex: female        Assessment & Plan:     Problem List Items Addressed This Visit          Other    Moderate episode of recurrent major depressive disorder (HCC)     Chronic, improved  No suicidal ideation  Continue Prozac 20 mg daily         Relevant Medications    FLUoxetine (PROZAC) 20 MG capsule    Generalized anxiety disorder with panic attacks     Chronic, controlled  See depression  Continue hydroxyzine as needed         Relevant Medications    FLUoxetine (PROZAC) 20 MG capsule       Counseled patient regarding above diagnosis, including possible risks and complications, especially if left uncontrolled. Counseled patient as appropriate and relevant regarding any possible side effects, risks, and alternatives to treatment; the patient verbalizes understanding, and is in agreement with the plan as detailed above. All educational materials and instructions were discussed and included on the After Visit Summary. All questions answered to the patient's satisfaction. The patient was advised to call or send [employer] message for any concerns prior to next appointment. Return in about 3 months (around 2022) for follow-up. This provider and patient were wearing surgical masks and practiced social distancing when appropriate during visit due to COVID-19 pandemic. Subjective:     Chief Complaint   Patient presents with    Follow-up     For mood        Patient returns for follow-up mood  Weaning Zoloft was hard  Started Prozac  No side effects  Depression improved, more energy at work  Has used hydroxyzine a couple times for sleep  Getting about 6-7 hours of sleep per night      Review of Systems   Gastrointestinal:  Negative for diarrhea, nausea and vomiting. Neurological:  Negative for headaches. Psychiatric/Behavioral:  Positive for sleep disturbance (improved).  Negative for dysphoric mood and suicidal ideas. The patient is not nervous/anxious. Medical History:     Patient Active Problem List   Diagnosis    Paresthesia of both hands    PCOS (polycystic ovarian syndrome)    Obesity (BMI 30-39. 9)    Mixed hyperlipidemia    Moderate episode of recurrent major depressive disorder (HCC)    Generalized anxiety disorder with panic attacks        Past Medical History:   Diagnosis Date    Depression     Mononucleosis 07/2021    Polycystic ovaries        History reviewed. No pertinent surgical history. Family History   Problem Relation Age of Onset    Diabetes type 2  Maternal Grandfather     No Known Problems Mother     No Known Problems Father     Polycystic Ovary Syndrome Sister     No Known Problems Maternal Grandmother     No Known Problems Paternal Grandmother     No Known Problems Paternal Grandfather     No Known Problems Sister        Medications:     Current Outpatient Medications:     FLUoxetine (PROZAC) 20 MG capsule, Take 1 capsule by mouth daily, Disp: 30 capsule, Rfl: 2    hydrOXYzine HCl (ATARAX) 25 MG tablet, Take 1 tablet by mouth every 8 hours as needed for Anxiety (sleep), Disp: 90 tablet, Rfl: 2    sertraline (ZOLOFT) 100 MG tablet, Take 1 tablet by mouth daily (Patient not taking: Reported on 8/25/2022), Disp: 30 tablet, Rfl: 1    meloxicam (MOBIC) 7.5 MG tablet, Take 7.5 mg by mouth daily Prn (Patient not taking: Reported on 8/25/2022), Disp: , Rfl:     Allergies:      Allergies   Allergen Reactions    Amoxicillin        Social History:     Social History     Socioeconomic History    Marital status:      Spouse name: Not on file    Number of children: Not on file    Years of education: Not on file    Highest education level: Not on file   Occupational History    Not on file   Tobacco Use    Smoking status: Never    Smokeless tobacco: Never   Vaping Use    Vaping Use: Never used   Substance and Sexual Activity    Alcohol use: No    Drug use: No    Sexual activity: Yes Partners: Male     Comment: 3 male, condoms   Other Topics Concern    Not on file   Social History Narrative    Not on file     Social Determinants of Health     Financial Resource Strain: Medium Risk    Difficulty of Paying Living Expenses: Somewhat hard   Food Insecurity: No Food Insecurity    Worried About Running Out of Food in the Last Year: Never true    Ran Out of Food in the Last Year: Never true   Transportation Needs: Not on file   Physical Activity: Not on file   Stress: Not on file   Social Connections: Not on file   Intimate Partner Violence: Not on file   Housing Stability: Not on file       Physical Exam:     Vitals:    08/25/22 1339   BP: 128/80   Site: Right Upper Arm   Position: Sitting   Pulse: 97   Temp: 97.5 °F (36.4 °C)   SpO2: 97%   Weight: 197 lb (89.4 kg)   Height: 5' 2\" (1.575 m)       BP Readings from Last 3 Encounters:   08/25/22 128/80   07/14/22 112/82   06/16/22 96/70       Wt Readings from Last 3 Encounters:   08/25/22 197 lb (89.4 kg)   07/14/22 195 lb (88.5 kg)   06/16/22 194 lb 12.8 oz (88.4 kg)       Physical Exam  Vitals and nursing note reviewed. Constitutional:       General: She is not in acute distress. Appearance: Normal appearance. She is obese. She is not ill-appearing or diaphoretic. Cardiovascular:      Rate and Rhythm: Normal rate and regular rhythm. Heart sounds: Normal heart sounds. Pulmonary:      Effort: Pulmonary effort is normal. No respiratory distress. Breath sounds: Normal breath sounds. Skin:     General: Skin is warm and dry. Neurological:      Mental Status: She is alert and oriented to person, place, and time. Psychiatric:         Attention and Perception: Attention and perception normal.         Mood and Affect: Mood and affect normal.         Speech: Speech normal.         Behavior: Behavior normal. Behavior is cooperative. Thought Content:  Thought content normal.       Testing:   No orders of the defined types were placed in this encounter. No results found for this or any previous visit (from the past 24 hour(s)).

## 2022-10-19 DIAGNOSIS — F33.1 MODERATE EPISODE OF RECURRENT MAJOR DEPRESSIVE DISORDER (HCC): ICD-10-CM

## 2022-10-19 DIAGNOSIS — F41.0 GENERALIZED ANXIETY DISORDER WITH PANIC ATTACKS: ICD-10-CM

## 2022-10-19 DIAGNOSIS — F41.1 GENERALIZED ANXIETY DISORDER WITH PANIC ATTACKS: ICD-10-CM

## 2022-10-19 RX ORDER — FLUOXETINE HYDROCHLORIDE 20 MG/1
20 CAPSULE ORAL DAILY
Qty: 30 CAPSULE | Refills: 2 | Status: SHIPPED | OUTPATIENT
Start: 2022-10-19

## 2022-11-02 ENCOUNTER — OFFICE VISIT (OUTPATIENT)
Dept: PRIMARY CARE CLINIC | Age: 22
End: 2022-11-02
Payer: COMMERCIAL

## 2022-11-02 VITALS
OXYGEN SATURATION: 97 % | HEART RATE: 68 BPM | WEIGHT: 199.8 LBS | SYSTOLIC BLOOD PRESSURE: 110 MMHG | DIASTOLIC BLOOD PRESSURE: 70 MMHG | BODY MASS INDEX: 36.54 KG/M2 | TEMPERATURE: 97.6 F

## 2022-11-02 DIAGNOSIS — H92.09 OTALGIA, UNSPECIFIED LATERALITY: ICD-10-CM

## 2022-11-02 DIAGNOSIS — R19.7 DIARRHEA, UNSPECIFIED TYPE: ICD-10-CM

## 2022-11-02 DIAGNOSIS — J06.9 UPPER RESPIRATORY TRACT INFECTION, UNSPECIFIED TYPE: Primary | ICD-10-CM

## 2022-11-02 DIAGNOSIS — R52 BODY ACHES: ICD-10-CM

## 2022-11-02 DIAGNOSIS — R53.83 OTHER FATIGUE: ICD-10-CM

## 2022-11-02 DIAGNOSIS — R09.81 NASAL CONGESTION: ICD-10-CM

## 2022-11-02 DIAGNOSIS — J02.9 PHARYNGITIS, UNSPECIFIED ETIOLOGY: ICD-10-CM

## 2022-11-02 DIAGNOSIS — R11.2 NAUSEA AND VOMITING, UNSPECIFIED VOMITING TYPE: ICD-10-CM

## 2022-11-02 LAB
INFLUENZA A ANTIGEN, POC: NORMAL
INFLUENZA B ANTIGEN, POC: NORMAL
Lab: NORMAL
PERFORMING INSTRUMENT: NORMAL
QC PASS/FAIL: NORMAL
S PYO AG THROAT QL: NORMAL
SARS-COV-2, POC: NORMAL

## 2022-11-02 PROCEDURE — 87804 INFLUENZA ASSAY W/OPTIC: CPT | Performed by: NURSE PRACTITIONER

## 2022-11-02 PROCEDURE — 99214 OFFICE O/P EST MOD 30 MIN: CPT | Performed by: NURSE PRACTITIONER

## 2022-11-02 PROCEDURE — 87880 STREP A ASSAY W/OPTIC: CPT | Performed by: NURSE PRACTITIONER

## 2022-11-02 PROCEDURE — 87426 SARSCOV CORONAVIRUS AG IA: CPT | Performed by: NURSE PRACTITIONER

## 2022-11-02 RX ORDER — FLUTICASONE PROPIONATE 50 MCG
2 SPRAY, SUSPENSION (ML) NASAL DAILY
Qty: 16 G | Refills: 0 | Status: SHIPPED
Start: 2022-11-02 | End: 2022-11-29

## 2022-11-02 RX ORDER — DOXYCYCLINE HYCLATE 100 MG
100 TABLET ORAL 2 TIMES DAILY
Qty: 20 TABLET | Refills: 0 | Status: SHIPPED | OUTPATIENT
Start: 2022-11-02 | End: 2022-11-12

## 2022-11-02 ASSESSMENT — PATIENT HEALTH QUESTIONNAIRE - PHQ9
3. TROUBLE FALLING OR STAYING ASLEEP: 3
8. MOVING OR SPEAKING SO SLOWLY THAT OTHER PEOPLE COULD HAVE NOTICED. OR THE OPPOSITE, BEING SO FIGETY OR RESTLESS THAT YOU HAVE BEEN MOVING AROUND A LOT MORE THAN USUAL: 0
10. IF YOU CHECKED OFF ANY PROBLEMS, HOW DIFFICULT HAVE THESE PROBLEMS MADE IT FOR YOU TO DO YOUR WORK, TAKE CARE OF THINGS AT HOME, OR GET ALONG WITH OTHER PEOPLE: 2
6. FEELING BAD ABOUT YOURSELF - OR THAT YOU ARE A FAILURE OR HAVE LET YOURSELF OR YOUR FAMILY DOWN: 1
SUM OF ALL RESPONSES TO PHQ QUESTIONS 1-9: 15
4. FEELING TIRED OR HAVING LITTLE ENERGY: 3
9. THOUGHTS THAT YOU WOULD BE BETTER OFF DEAD, OR OF HURTING YOURSELF: 0
2. FEELING DOWN, DEPRESSED OR HOPELESS: 2
SUM OF ALL RESPONSES TO PHQ9 QUESTIONS 1 & 2: 4
SUM OF ALL RESPONSES TO PHQ QUESTIONS 1-9: 15
SUM OF ALL RESPONSES TO PHQ QUESTIONS 1-9: 15
1. LITTLE INTEREST OR PLEASURE IN DOING THINGS: 2
5. POOR APPETITE OR OVEREATING: 3
SUM OF ALL RESPONSES TO PHQ QUESTIONS 1-9: 15
7. TROUBLE CONCENTRATING ON THINGS, SUCH AS READING THE NEWSPAPER OR WATCHING TELEVISION: 1

## 2022-11-02 NOTE — PROGRESS NOTES
Chief Complaint   Nausea & Vomiting (X 1 day ), Diarrhea, Otalgia (Bilateral ), Pharyngitis, Generalized Body Aches, Fatigue, and Other (Just finished flagyl and  cephalexin recently for kidney stones )      History of Present Illness   Source of history provided by:  patient. Cris Sanchez is a 25 y.o. old female who presents to the walk in clinic with complaints of Fever, Generalized Body Aches, Headache, Pharyngitis, Rhinorrhea, Post nasal drip, Nausea, Vomiting, Diarrhea , Fatigue, and Bilateral ear aches  x 2 days. States symptoms have worsened, since onset. Has been taking Excedrin, without symptomatic relief. Denies any Cough, Shortness of breath, Chest Pain, LE Edema, Abdominal Pain, Rash, or Close contact with a lab confirmed COVID-19 patient within 14 days of symptom onset . Denies any hx of asthma, COPD, emphysema, or pneumonia. ROS   Pertinent positives and negatives are stated within HPI, all other systems reviewed and are negative. Past Medical History:  has a past medical history of Depression, Mononucleosis, and Polycystic ovaries. Past Surgical History:  has no past surgical history on file. Social History:  reports that she has never smoked. She has never used smokeless tobacco. She reports that she does not drink alcohol and does not use drugs. Family History: family history includes Diabetes type 2  in her maternal grandfather; No Known Problems in her father, maternal grandmother, mother, paternal grandfather, paternal grandmother, and sister; Polycystic Ovary Syndrome in her sister. Allergies: Amoxicillin    Physical Exam   Vital Signs:  /70 (Site: Left Upper Arm, Position: Sitting, Cuff Size: Large Adult)   Pulse 68   Temp 97.6 °F (36.4 °C) (Temporal)   Wt 199 lb 12.8 oz (90.6 kg)   SpO2 97%   BMI 36.54 kg/m²    Oxygen Saturation Interpretation: Normal.    Constitutional:  Alert, development consistent with age. NAD.   Moderate amount of swelling noted to bilateral nares. Head:  NC/NT. Airway patent. Ears: TMs clear bilaterally. Canals without exudate or swelling bilaterally. Mouth: Posterior pharynx with moderate erythema and clear postnasal drip. No tonsillar hypertrophy or exudate. Neck:  Normal ROM. Supple. No anterior cervical adenopathy noted. Lungs: CTAB without wheezes, rales, or rhonchi. CV:  Regular rate and rhythm, normal heart sounds, without pathological murmurs, ectopy, gallops, or rubs. Skin:  Normal turgor. Warm, dry, without visible rash. Lymphatic: No lymphangitis or adenopathy noted. Neurological:  Oriented. Motor functions intact. Lab / Imaging Results   (All laboratory and radiology results have been personally reviewed by myself)  Labs:  Results for orders placed or performed in visit on 11/02/22   POCT COVID-19, Antigen   Result Value Ref Range    SARS-COV-2, POC Not-Detected Not Detected    Lot Number [id number]     QC Pass/Fail pass     Performing Instrument BD Veritor    POCT Influenza A/B Antigen (BD Veritor)   Result Value Ref Range    Inflenza A Ag neg     Influenza B Ag neg    POCT rapid strep A   Result Value Ref Range    Strep A Ag None Detected None Detected       Imaging: All Radiology results interpreted by Radiologist unless otherwise noted. No results found. Medical Decision Making   Pt non-toxic, in no apparent distress and stable at time of discharge. Assessment/Plan   Judy Fitzpatrick was seen today for nausea & vomiting, diarrhea, otalgia, pharyngitis, generalized body aches, fatigue and other. Diagnoses and all orders for this visit:    Upper respiratory tract infection, unspecified type  -     doxycycline hyclate (VIBRA-TABS) 100 MG tablet; Take 1 tablet by mouth 2 times daily for 10 days    Pharyngitis, unspecified etiology  -     POCT COVID-19, Antigen  -     POCT Influenza A/B Antigen (BD Veritor)  -     POCT rapid strep A  -     Culture, Throat;  Future    Nasal congestion  -     fluticasone (FLONASE) 50 MCG/ACT nasal spray; 2 sprays by Each Nostril route daily    Nausea and vomiting, unspecified vomiting type  -     POCT COVID-19, Antigen  -     POCT Influenza A/B Antigen (BD Veritor)  -     POCT rapid strep A    Diarrhea, unspecified type  -     POCT COVID-19, Antigen  -     POCT Influenza A/B Antigen (BD Veritor)  -     POCT rapid strep A    Otalgia, unspecified laterality  -     POCT COVID-19, Antigen  -     POCT Influenza A/B Antigen (BD Veritor)  -     POCT rapid strep A    Body aches  -     POCT COVID-19, Antigen  -     POCT Influenza A/B Antigen (BD Veritor)  -     POCT rapid strep A    Other fatigue  -     POCT COVID-19, Antigen  -     POCT Influenza A/B Antigen (BD Veritor)  -     POCT rapid strep A      Rapid COVID-19, influenza A/B and strep swab obtained and noted to be negative. Increase fluids and rest. Symptomatic relief discussed including Tylenol prn pain/fever. Schedule f/u with PCP in 7-10 days if symptoms persist. ED sooner if symptoms worsen or change. ED immediately with high or refractory fever, progressive SOB, dyspnea, CP, calf pain/swelling, shaking chills, vomiting, abdominal pain, lethargy, flank pain, or decreased urinary output. Pt verbalizes understanding and is in agreement with plan of care. All questions answered. DONNIE Hays NP    This visit was provided as a focused evaluation during the [place] -19 pandemic/national emergency. A comprehensive review of all previous patient history and testing was not conducted. Pertinent findings were elicited during the visit. *NOTE: This report was transcribed using voice recognition software. Every effort was made to ensure accuracy; however, inadvertent computerized transcription errors may be present.

## 2022-11-05 LAB — THROAT CULTURE: NORMAL

## 2022-11-11 ENCOUNTER — TELEPHONE (OUTPATIENT)
Dept: PRIMARY CARE CLINIC | Age: 22
End: 2022-11-11

## 2022-11-11 NOTE — TELEPHONE ENCOUNTER
Josephine Gayle brought to our attention that this patient scheduled herself as an Evisit on him today for a headache. Pt was contacted and told she needed to set up appt with Dr. Basilio Jackson or come in through [facility], and she said she willl try to come today or Monday through walk in.

## 2022-11-18 ENCOUNTER — OFFICE VISIT (OUTPATIENT)
Dept: PRIMARY CARE CLINIC | Age: 22
End: 2022-11-18
Payer: COMMERCIAL

## 2022-11-18 VITALS
DIASTOLIC BLOOD PRESSURE: 82 MMHG | TEMPERATURE: 97.4 F | HEART RATE: 98 BPM | SYSTOLIC BLOOD PRESSURE: 130 MMHG | OXYGEN SATURATION: 98 % | BODY MASS INDEX: 36.47 KG/M2 | HEIGHT: 62 IN | WEIGHT: 198.2 LBS

## 2022-11-18 DIAGNOSIS — J40 SINOBRONCHITIS: Primary | ICD-10-CM

## 2022-11-18 DIAGNOSIS — J32.9 SINOBRONCHITIS: Primary | ICD-10-CM

## 2022-11-18 DIAGNOSIS — J02.9 SORE THROAT: ICD-10-CM

## 2022-11-18 DIAGNOSIS — H69.83 ETD (EUSTACHIAN TUBE DYSFUNCTION), BILATERAL: ICD-10-CM

## 2022-11-18 LAB
INFLUENZA A ANTIGEN, POC: NEGATIVE
INFLUENZA B ANTIGEN, POC: NEGATIVE
Lab: NORMAL
PERFORMING INSTRUMENT: NORMAL
QC PASS/FAIL: NORMAL
S PYO AG THROAT QL: NORMAL
SARS-COV-2, POC: NORMAL

## 2022-11-18 PROCEDURE — 99214 OFFICE O/P EST MOD 30 MIN: CPT | Performed by: EMERGENCY MEDICINE

## 2022-11-18 PROCEDURE — 87426 SARSCOV CORONAVIRUS AG IA: CPT | Performed by: EMERGENCY MEDICINE

## 2022-11-18 PROCEDURE — 87880 STREP A ASSAY W/OPTIC: CPT | Performed by: EMERGENCY MEDICINE

## 2022-11-18 PROCEDURE — 87804 INFLUENZA ASSAY W/OPTIC: CPT | Performed by: EMERGENCY MEDICINE

## 2022-11-18 RX ORDER — NORGESTIMATE AND ETHINYL ESTRADIOL 0.25-0.035
KIT ORAL
COMMUNITY
Start: 2022-11-14

## 2022-11-18 RX ORDER — AZITHROMYCIN 250 MG/1
TABLET, FILM COATED ORAL
Qty: 1 PACKET | Refills: 0 | Status: SHIPPED | OUTPATIENT
Start: 2022-11-18 | End: 2022-11-23

## 2022-11-18 RX ORDER — METHYLPREDNISOLONE 4 MG/1
TABLET ORAL
Qty: 1 KIT | Refills: 0 | Status: SHIPPED | OUTPATIENT
Start: 2022-11-18

## 2022-11-18 RX ORDER — CETIRIZINE HYDROCHLORIDE, PSEUDOEPHEDRINE HYDROCHLORIDE 5; 120 MG/1; MG/1
1 TABLET, FILM COATED, EXTENDED RELEASE ORAL 2 TIMES DAILY
Qty: 14 TABLET | Refills: 0 | Status: SHIPPED | OUTPATIENT
Start: 2022-11-18

## 2022-11-18 RX ORDER — MEDROXYPROGESTERONE ACETATE 10 MG/1
TABLET ORAL
COMMUNITY
Start: 2022-11-14

## 2022-11-18 ASSESSMENT — ENCOUNTER SYMPTOMS
EYE DISCHARGE: 0
ABDOMINAL DISTENTION: 0
SHORTNESS OF BREATH: 0
NAUSEA: 0
COUGH: 0
VOMITING: 0
EYE REDNESS: 0
EYE PAIN: 0
SINUS PAIN: 1
CHEST TIGHTNESS: 1
SINUS PRESSURE: 1
BACK PAIN: 0
WHEEZING: 0
DIARRHEA: 0
SORE THROAT: 1

## 2022-11-18 NOTE — PROGRESS NOTES
Chief Complaint:   Pharyngitis, Otalgia, Generalized Body Aches, and Fatigue (Was seen on [facility] for same symptoms and given antibiotic but she is feeling worse )      History of Present Illness   HPI:  Mai Marte is a 25 y.o. female who presents to [facility] today for return visit re: not feeling better head and chest congestion, sore throat. Prior to Visit Medications    Medication Sig Taking? Authorizing Provider   medroxyPROGESTERone (PROVERA) 10 MG tablet  Yes Historical Provider, MD   azithromycin (ZITHROMAX Z-NONA) 250 MG tablet Take 2 tablets by mouth daily for 1 day, THEN 1 tablet daily for 4 days. Yes Kate Wise,    methylPREDNISolone (MEDROL, NONA,) 4 MG tablet Follow package instructions Yes Fuad Guo,    cetirizine-psuedoephedrine (ZYRTEC-D) 5-120 MG per extended release tablet Take 1 tablet by mouth 2 times daily Yes Homero Wise,    FLUoxetine (PROZAC) 20 MG capsule TAKE 1 CAPSULE BY MOUTH DAILY Yes Leonel Hopper MD   hydrOXYzine HCl (ATARAX) 25 MG tablet Take 1 tablet by mouth every 8 hours as needed for Anxiety (sleep) Yes Leonel Hopper MD   ESTARYLLA 0.25-35 MG-MCG per tablet TAKE 1 [place]  Patient not taking: Reported on 11/18/2022  Historical Provider, MD   fluticasone (FLONASE) 50 MCG/ACT nasal spray 2 sprays by Each Nostril route daily  Patient not taking: Reported on 11/18/2022  DONNIE Gallardo - NP       Review of Systems   Review of Systems   Constitutional:  Positive for activity change and fatigue. Negative for chills and fever. HENT:  Positive for congestion, sinus pressure, sinus pain and sore throat. Negative for ear pain. Eyes:  Negative for pain, discharge and redness. Respiratory:  Positive for chest tightness. Negative for cough, shortness of breath and wheezing. Cardiovascular:  Negative for chest pain. Gastrointestinal:  Negative for abdominal distention, diarrhea, nausea and vomiting. Genitourinary:  Negative for dysuria and frequency. Musculoskeletal:  Negative for arthralgias and back pain. Skin:  Negative for rash and wound. Neurological:  Negative for weakness and headaches. Hematological:  Negative for adenopathy. Psychiatric/Behavioral: Negative. All other systems reviewed and are negative. Patient's medical, social, and family history reviewed    Past Medical History:  has a past medical history of Depression, Mononucleosis, and Polycystic ovaries. Past Surgical History:  has no past surgical history on file. Social History:  reports that she has never smoked. She has never used smokeless tobacco. She reports that she does not drink alcohol and does not use drugs. Family History: family history includes Diabetes type 2  in her maternal grandfather; No Known Problems in her father, maternal grandmother, mother, paternal grandfather, paternal grandmother, and sister; Polycystic Ovary Syndrome in her sister. Allergies: Amoxicillin    Physical Exam   Vital Signs:  /82 (Site: Left Upper Arm, Position: Sitting, Cuff Size: Large Adult)   Pulse 98   Temp 97.4 °F (36.3 °C)   Ht 5' 2\" (1.575 m)   Wt 198 lb 3.2 oz (89.9 kg)   SpO2 98%   BMI 36.25 kg/m²    Oxygen Saturation Interpretation: Normal.    Physical Exam  Vitals and nursing note reviewed. Constitutional:       Appearance: She is well-developed. HENT:      Head: Normocephalic and atraumatic. Right Ear: Hearing and external ear normal. A middle ear effusion is present. Left Ear: Hearing and external ear normal. A middle ear effusion is present. Nose: Nasal tenderness, congestion and rhinorrhea present. Right Sinus: Frontal sinus tenderness present. Left Sinus: Frontal sinus tenderness present. Mouth/Throat:      Pharynx: Uvula midline. Posterior oropharyngeal erythema present.    Eyes:      General: Lids are normal.      Conjunctiva/sclera: Conjunctivae normal.      Pupils: Pupils are equal, round, and reactive to light. Cardiovascular:      Rate and Rhythm: Regular rhythm. Tachycardia present. Heart sounds: Normal heart sounds. No murmur heard. Pulmonary:      Effort: Pulmonary effort is normal.      Breath sounds: Wheezing present. Abdominal:      General: Bowel sounds are normal.      Palpations: Abdomen is soft. Abdomen is not rigid. Tenderness: There is no abdominal tenderness. There is no guarding or rebound. Musculoskeletal:      Cervical back: Normal range of motion and neck supple. Skin:     General: Skin is warm and dry. Findings: No abrasion or rash. Neurological:      Mental Status: She is alert and oriented to person, place, and time. GCS: GCS eye subscore is 4. GCS verbal subscore is 5. GCS motor subscore is 6. Cranial Nerves: No cranial nerve deficit. Sensory: No sensory deficit. Coordination: Coordination normal.      Gait: Gait normal.       Test Results Section   (All laboratory and radiology results have been personally reviewed by myself)  Labs:  Recent Results (from the past 24 hour(s))   POCT COVID-19, Antigen    Collection Time: 11/18/22  8:58 AM   Result Value Ref Range    SARS-COV-2, POC Not-Detected Not Detected    Lot Number [id number]     QC Pass/Fail pass     Performing Instrument BD Veritor    POCT rapid strep A    Collection Time: 11/18/22  8:58 AM   Result Value Ref Range    Strep A Ag None Detected None Detected   POCT Influenza A/B Antigen (BD Veritor)    Collection Time: 11/18/22  8:59 AM   Result Value Ref Range    Inflenza A Ag negative     Influenza B Ag negative        Imaging: All Radiology results interpreted by Radiologist unless otherwise noted. No results found. Assessment / Plan   Impression(s):  Judy Fitzpatrick was seen today for pharyngitis, otalgia, generalized body aches and fatigue.     Diagnoses and all orders for this visit:    Sinobronchitis  -     azithromycin (ZITHROMAX Z-NONA) 250 MG tablet; Take 2 tablets by mouth daily for 1 day, THEN 1 tablet daily for 4 days. -     methylPREDNISolone (MEDROL, NONA,) 4 MG tablet; Follow package instructions  -     cetirizine-psuedoephedrine (ZYRTEC-D) 5-120 MG per extended release tablet; Take 1 tablet by mouth 2 times daily    Sore throat  -     POCT COVID-19, Antigen  -     POCT Influenza A/B Antigen (BD Veritor)  -     POCT rapid strep A    ETD (Eustachian tube dysfunction), bilateral  -     methylPREDNISolone (MEDROL, NONA,) 4 MG tablet; Follow package instructions  -     cetirizine-psuedoephedrine (ZYRTEC-D) 5-120 MG per extended release tablet; Take 1 tablet by mouth 2 times daily      Discharged home. Patient condition is good    No follow-ups on file. New Medications     New Prescriptions    AZITHROMYCIN (ZITHROMAX Z-NONA) 250 MG TABLET    Take 2 tablets by mouth daily for 1 day, THEN 1 tablet daily for 4 days.     CETIRIZINE-PSUEDOEPHEDRINE (ZYRTEC-D) 5-120 MG PER EXTENDED RELEASE TABLET    Take 1 tablet by mouth 2 times daily    METHYLPREDNISOLONE (MEDROL, NONA,) 4 MG TABLET    Follow package instructions       Electronically signed by Marti Goldmann, DO   DD: 11/18/22

## 2022-11-29 DIAGNOSIS — R09.81 NASAL CONGESTION: ICD-10-CM

## 2022-11-29 RX ORDER — FLUTICASONE PROPIONATE 50 MCG
SPRAY, SUSPENSION (ML) NASAL
Qty: 16 G | Refills: 0 | Status: SHIPPED | OUTPATIENT
Start: 2022-11-29

## 2025-06-04 PROCEDURE — 88305 TISSUE EXAM BY PATHOLOGIST: CPT | Performed by: PATHOLOGY

## 2025-06-04 PROCEDURE — 88305 TISSUE EXAM BY PATHOLOGIST: CPT

## 2025-06-06 ENCOUNTER — LAB REQUISITION (OUTPATIENT)
Dept: LAB | Facility: HOSPITAL | Age: 25
End: 2025-06-06
Payer: COMMERCIAL

## 2025-06-06 DIAGNOSIS — N84.0 POLYP OF CORPUS UTERI: ICD-10-CM

## 2025-06-18 LAB
LABORATORY COMMENT REPORT: NORMAL
PATH REPORT.FINAL DX SPEC: NORMAL
PATH REPORT.GROSS SPEC: NORMAL
PATH REPORT.RELEVANT HX SPEC: NORMAL
PATH REPORT.TOTAL CANCER: NORMAL
RESIDENT REVIEW: NORMAL